# Patient Record
Sex: MALE | Race: WHITE
[De-identification: names, ages, dates, MRNs, and addresses within clinical notes are randomized per-mention and may not be internally consistent; named-entity substitution may affect disease eponyms.]

---

## 2017-07-30 ENCOUNTER — HOSPITAL ENCOUNTER (OUTPATIENT)
Dept: HOSPITAL 76 - EMS | Age: 82
End: 2017-07-30
Attending: SURGERY
Payer: MEDICARE

## 2017-07-30 DIAGNOSIS — Z03.89: Primary | ICD-10-CM
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
## 2017-11-11 ENCOUNTER — HOSPITAL ENCOUNTER (OUTPATIENT)
Dept: HOSPITAL 76 - EMS | Age: 82
Discharge: ANOTHER HOSPITAL | End: 2017-11-11
Attending: SURGERY
Payer: MEDICARE

## 2017-11-11 ENCOUNTER — HOSPITAL ENCOUNTER (OUTPATIENT)
Dept: HOSPITAL 76 - ED | Age: 82
Setting detail: OBSERVATION
LOS: 1 days | Discharge: HOME | End: 2017-11-12
Attending: INTERNAL MEDICINE | Admitting: INTERNAL MEDICINE
Payer: MEDICARE

## 2017-11-11 DIAGNOSIS — R07.9: Primary | ICD-10-CM

## 2017-11-11 DIAGNOSIS — I71.4: ICD-10-CM

## 2017-11-11 DIAGNOSIS — E87.1: ICD-10-CM

## 2017-11-11 DIAGNOSIS — R10.12: ICD-10-CM

## 2017-11-11 DIAGNOSIS — F03.90: ICD-10-CM

## 2017-11-11 DIAGNOSIS — J20.9: ICD-10-CM

## 2017-11-11 DIAGNOSIS — I69.351: ICD-10-CM

## 2017-11-11 DIAGNOSIS — I25.10: ICD-10-CM

## 2017-11-11 DIAGNOSIS — I11.9: ICD-10-CM

## 2017-11-11 DIAGNOSIS — L89.512: ICD-10-CM

## 2017-11-11 DIAGNOSIS — L89.212: ICD-10-CM

## 2017-11-11 DIAGNOSIS — Z66: ICD-10-CM

## 2017-11-11 DIAGNOSIS — E87.5: ICD-10-CM

## 2017-11-11 DIAGNOSIS — I69.322: ICD-10-CM

## 2017-11-11 DIAGNOSIS — I70.0: ICD-10-CM

## 2017-11-11 DIAGNOSIS — Z79.82: ICD-10-CM

## 2017-11-11 DIAGNOSIS — I69.320: ICD-10-CM

## 2017-11-11 DIAGNOSIS — E86.0: ICD-10-CM

## 2017-11-11 DIAGNOSIS — I71.2: ICD-10-CM

## 2017-11-11 LAB
ALBUMIN/GLOB SERPL: 1 {RATIO} (ref 1–2.2)
ANION GAP SERPL CALCULATED.4IONS-SCNC: 11 MMOL/L (ref 6–13)
BASOPHILS NFR BLD AUTO: 0.4 %
BILIRUB BLD-MCNC: 0.5 MG/DL (ref 0.2–1)
BUN SERPL-MCNC: 21 MG/DL (ref 6–20)
CALCIUM UR-MCNC: 9.1 MG/DL (ref 8.5–10.3)
CHLORIDE SERPL-SCNC: 92 MMOL/L (ref 101–111)
CO2 SERPL-SCNC: 23 MMOL/L (ref 21–32)
CREAT SERPLBLD-SCNC: 1.2 MG/DL (ref 0.6–1.2)
CUL URINE ADD CHARGE: (no result)
EOSINOPHIL NFR BLD AUTO: 0.9 %
ERYTHROCYTE [DISTWIDTH] IN BLOOD BY AUTOMATED COUNT: 14.6 % (ref 12–15)
GFRSERPLBLD MDRD-ARVRAT: 57 ML/MIN/{1.73_M2} (ref 89–?)
GLOBULIN SER-MCNC: 3.5 G/DL (ref 2.1–4.2)
GLUCOSE SERPL-MCNC: 141 MG/DL (ref 70–100)
HCT VFR BLD AUTO: 36.4 % (ref 42–52)
HGB UR QL STRIP: 11.9 G/DL (ref 14–18)
LIPASE SERPL-CCNC: 88 U/L (ref 22–51)
LYMPHOBLASTS # BLD: 7 %
LYMPHOCYTES NFR BLD AUTO: 7.8 %
MCH RBC QN AUTO: 30.4 PG (ref 27–31)
MCHC RBC AUTO-ENTMCNC: 32.7 G/DL (ref 32–36)
MCV RBC AUTO: 92.8 FL (ref 80–94)
MONOCYTES NFR BLD AUTO: 4.6 %
NEUTROPHILS # SNV AUTO: 13.5 X10^3/UL (ref 4.8–10.8)
NEUTROPHILS NFR BLD AUTO: 86.3 %
NEUTS BAND NFR BLD MANUAL: 84 %
NEUTS BAND NFR BLD: 0 %
NP AUTO DIFFERENTIAL?: YES
NP MAN DIFFERENTIAL?: NO
PDW BLD AUTO: 7.1 FL (ref 7.4–11.4)
PH UR STRIP.AUTO: 6 PH (ref 5–7.5)
PLATELET BLD QL SMEAR: (no result)
POTASSIUM SERPL-SCNC: 5.3 MMOL/L (ref 3.5–5)
PROT SPEC-MCNC: 7 G/DL (ref 6.7–8.2)
RBC MAR: 3.92 10^6/UL (ref 4.7–6.1)
SODIUM SERPLBLD-SCNC: 126 MMOL/L (ref 135–145)
SP GR UR STRIP.AUTO: 1.01 (ref 1–1.03)
UA CHARGE (STRIP ONLY): YES
UA W/ MICROSCOPIC CHARGE: (no result)
UR CULTURE IF IND: (no result)
UROBILINOGEN UR STRIP.AUTO-MCNC: NEGATIVE MG/DL
WBC # BLD: 13.5 X10^3/UL

## 2017-11-11 PROCEDURE — 71275 CT ANGIOGRAPHY CHEST: CPT

## 2017-11-11 PROCEDURE — 51701 INSERT BLADDER CATHETER: CPT

## 2017-11-11 PROCEDURE — 93005 ELECTROCARDIOGRAM TRACING: CPT

## 2017-11-11 PROCEDURE — 99283 EMERGENCY DEPT VISIT LOW MDM: CPT

## 2017-11-11 PROCEDURE — 99284 EMERGENCY DEPT VISIT MOD MDM: CPT

## 2017-11-11 PROCEDURE — 70450 CT HEAD/BRAIN W/O DYE: CPT

## 2017-11-11 PROCEDURE — 81001 URINALYSIS AUTO W/SCOPE: CPT

## 2017-11-11 PROCEDURE — 96372 THER/PROPH/DIAG INJ SC/IM: CPT

## 2017-11-11 PROCEDURE — 96361 HYDRATE IV INFUSION ADD-ON: CPT

## 2017-11-11 PROCEDURE — 83690 ASSAY OF LIPASE: CPT

## 2017-11-11 PROCEDURE — 94640 AIRWAY INHALATION TREATMENT: CPT

## 2017-11-11 PROCEDURE — 99285 EMERGENCY DEPT VISIT HI MDM: CPT

## 2017-11-11 PROCEDURE — 80048 BASIC METABOLIC PNL TOTAL CA: CPT

## 2017-11-11 PROCEDURE — 84484 ASSAY OF TROPONIN QUANT: CPT

## 2017-11-11 PROCEDURE — 36415 COLL VENOUS BLD VENIPUNCTURE: CPT

## 2017-11-11 PROCEDURE — 80053 COMPREHEN METABOLIC PANEL: CPT

## 2017-11-11 PROCEDURE — 71010: CPT

## 2017-11-11 PROCEDURE — 96374 THER/PROPH/DIAG INJ IV PUSH: CPT

## 2017-11-11 PROCEDURE — 85025 COMPLETE CBC W/AUTO DIFF WBC: CPT

## 2017-11-11 PROCEDURE — 74177 CT ABD & PELVIS W/CONTRAST: CPT

## 2017-11-11 PROCEDURE — 87086 URINE CULTURE/COLONY COUNT: CPT

## 2017-11-11 PROCEDURE — 83880 ASSAY OF NATRIURETIC PEPTIDE: CPT

## 2017-11-11 PROCEDURE — 81003 URINALYSIS AUTO W/O SCOPE: CPT

## 2017-11-11 RX ADMIN — SODIUM CHLORIDE, PRESERVATIVE FREE SCH: 5 INJECTION INTRAVENOUS at 22:02

## 2017-11-11 RX ADMIN — HEPARIN SODIUM SCH UNIT: 5000 INJECTION, SOLUTION INTRAVENOUS; SUBCUTANEOUS at 22:09

## 2017-11-11 NOTE — CT REPORT
EXAM:

CT HEAD

 

EXAM DATE: 11/11/2017 04:47 PM.

 

CLINICAL HISTORY: Headache.

 

COMPARISON: 08/17/2016.

 

TECHNIQUE: Multiaxial CT images were obtained from the foramen magnum to the vertex. Reformats: Coron
al. IV contrast: None.

 

In accordance with CT protocol optimization, one or more of the following dose reduction techniques w
ere utilized for this exam: automated exposure control, adjustment of mA and/or KV based on patient s
ize, or use of iterative reconstructive technique.

 

FINDINGS:

Parenchyma: There is a large area of encephalomalacia in the left cerebral hemisphere consistent with
 an old large MCA territory infarct. The size and appearance of the infarct appears without significa
nt change. Negative for acute hemorrhage. No midline shift. 

 

Extraaxial Spaces:  There is volume loss without abnormal intracranial fluid collections.

 

Ventricles: The ventricles appear symmetric in size and normal in location.

 

Sinuses and Orbits: Imaged paranasal sinuses, orbits, and mastoids show no significant abnormality.

 

Bones: No calvarium fracture.

 

Other: None.

 

IMPRESSION: 

1. 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 

2. No acute hemorrhage, mass effect, or other interval change.

 

[facility]

Referring Provider Line: [phone]

 

SITE ID: [id number]

## 2017-11-11 NOTE — XRAY PRELIMINARY REPORT
Accession: [id number]

Exam: XR CHEST 1 VIEW

 

IMPRESSION: Mild pulmonary edema pattern present, new from 08/15/2016.

 

[facility]

 

SITE ID: [id number]

## 2017-11-11 NOTE — CT REPORT
EXAM:

CT ABDOMEN AND PELVIS

 

EXAM DATE: 11/11/2017 04:46 PM.

 

CLINICAL HISTORY: Left lower quadrant abdominal pain.

 

COMPARISONS: None.

 

TECHNIQUE: Routine helical CT imaging was performed through the abdomen and pelvis. IV contrast: 100M
L OF ISOVUE 300. Enteric contrast: No. Reconstructions: Coronal and sagittal.

 

In accordance with CT protocol optimization, one or more of the following dose reduction techniques w
ere utilized for this exam: automated exposure control, adjustment of mA and/or KV based on patient s
ize, or use of iterative reconstructive technique.

 

FINDINGS: 

Lung Bases: There are nodular opacities seen at the periphery of the lateral right middle lobe in add
ition to the lung bases, right greater than left.

 

Liver: Normal. No masses.

 

Gallbladder/Bile Ducts: Post cholecystectomy changes noted, with mild intrahepatic bile duct dilatati
on.

 

Spleen: Normal.

 

Pancreas: Normal.

 

Adrenal Glands: Normal.

 

Kidneys: Bilateral renal cysts are present. No hydronephrosis seen bilaterally. Nonobstructing stones
 are seen in the left kidney, measuring up to 7 mm in greatest dimension at the midportion.

 

Peritoneal Cavity/Bowel: Normal. No free fluid, free air or adenopathy. No masses or acute inflammato
ry process. Appendix is not conclusively seen.

 

Pelvic Organs: Normal. The bladder and visualized pelvic organs are within normal limits.

 

Vasculature: Diffuse vascular calcifications are seen, with additional soft plaque present. There is 
prominence of the infrarenal aorta, measuring up to 4.5 cm in greatest dimension.

 

Bones: Moderate severe multilevel osteoarthritic changes seen, with compression deformity involving L
1. Posterior spinal hardware is noted at the level of L3-L5. Levoscoliosis is seen. Patient is status
 post pinning of the right hip.

 

Other: None.

 

IMPRESSION: 

1. Nodular opacities at the periphery of the lateral right middle lobe in addition to the lung bases.
 While these may be inflammatory, neoplastic etiology cannot be fully excluded. Clinical correlation 
is recommended. 

2. Postcholecystectomy changes with mild intrahepatic bile duct dilatation. 

3. Bilateral renal cysts are seen, with nonobstructing left renal calculus measuring up to 7 mm. 

4. Diffuse vascular calcifications noted, with soft and hard plaque present. There is prominence of t
he infrarenal aorta measuring up to 4.5 cm in greatest dimension. 

5. Moderate severe multilevel osteoarthritic changes, with compression deformity involving L1, age in
determinate. Posterior spinal hardware present at L3-L5, with closed reduction pinning of right femur
.

 

[facility]

Referring Provider Line: [phone]

 

SITE ID: [id number]

## 2017-11-11 NOTE — CT REPORT
EXAM:

CTA CHEST

 

EXAM DATE: 11/11/2017 05:55 p.m.

 

CLINICAL HISTORY: Chest pain..

 

COMPARISON: 11/11/2017. 08/17/2016.

 

TECHNIQUE: Following the intravenous administration of 75 mL of Isovue-370, multiplanar 3D/MIP recons
truction of the thoracic aorta was performed.

 

In accordance with CT protocol optimization, one or more of the following dose reduction techniques w
ere utilized for this exam: automated exposure control, adjustment of mA and/or KV based on patient s
ize, or use of iterative reconstructive technique.

 

FINDINGS: 

Vascular Structures: No dissection. Extensive calcified and noncalcified plaque is noted largely in t
he aortic arch and descending thoracic aorta, as well as the abdominal aorta. Extensive lobular plaqu
e, noncalcified, extends into the lumen of the aortic arch distally and throughout the descending tho
racic aorta noting that some noncalcified plaque is ulcerative. No penetrating plaque is definitively
 seen. Calcified and noncalcified plaque is seen in the abdominal aorta. No dissection. No periaortic
 hematoma.

 

Maximal size of the ascending aorta at the mid sinuses of Valsalva measures 3.7-3.8 cm. maximum size 
of mid ascending aorta measures 4.6-4.7 cm meters. Aortic arch measures 3.8 cm. Proximal descending t
horacic aorta measures 4 cm. Mid descending thoracic aorta measures 3.7 cm. Distal thoracic descendin
g aorta measures 3.2 cm. Infrarenal abdominal aortic aneurysmal dilatation is seen measuring in AP di
mension 4.3 cm, and in transverse dimension on the coronal images, 4.2 cm. Extensive calcified and no
ncalcified plaque is seen  in the abdominal aorta. The SMA and celiac are patent. There appears steno
sis  caused by calcified plaque at the origin of the celiac and SMA. MARIZOL is patent.

 

Lungs/Pleura: No endobronchial obstruction. There is multifocal centrilobular and nodular airspace op
acities in the right upper lobe, right middle lobe, and right lower lobe as well as a left lower lobe
. There is bronchial dilatation and bronchiectasis multifocally seen bilaterally largely in the right
 lung and left lower lobe. Nodular opacities are seen in the left lower lobe with some cystic change 
noted in the left lower lobe. Basilar parenchymal scarring and pleural thickening. Calcified left low
er lobe granuloma noted.

 

Mediastinum: Heart is enlarged. Coronary artery calcifications. Small hiatal hernia. Calcified medias
tinal and hilar lymph nodes. In the right lobe of the thyroid gland is a 13 mm nodule. Trace pericard
ial effusion. Calcified mediastinal and hilar lymph nodes are seen. Small hiatal hernia.

 

Upper Abdomen: Included portions of the liver, spleen, pancreas, and adrenals are unremarkable. Kidne
ys enhance. There is contrast present within both renal collecting systems. Bilateral renal low-atten
uation lesions are seen, possibly cysts, the largest in the mid left kidney measuring 4.2 cm, and in 
the posterior upper pole of the right kidney measuring 3.1 cm.

 

Other: Stomach is mildly distended. Small bowel is fluid-filled with mild gaseous distention without 
obstruction. Diverticula are seen in portion of the colon, largely distally.  Known for diverticuliti
s.

 

No large retroperitoneal or mesenteric lymph nodes.

 

There is thoracic and lumbar scoliosis. Degenerative changes of the thoracic and lumbar spine. There 
has been a lumbar fusion incompletely included. There has been a lumbar laminectomy. No acute osseous
 abnormalities. Degenerative changes of both shoulders. Healed right proximal humerus fracture.

 

IMPRESSION: 

1. No evidence of aortic dissection or pulmonary embolus. 

2. 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 Mild dilatation of the pro
ximal descending thoracic aorta measuring 4 cm. 

3. Cardiomegaly. Coronary artery atherosclerosis. Extensive calcified and noncalcified atheromatous p
laque involving the distal aortic arch, descending thoracic aorta and abdominal aorta, with extensive
 irregular noncalcified plaque extending into the lumen of the descending thoracic aorta and abdomina
l aorta, some of which is ulcerative particularly within the descending thoracic aorta. 

4. Aneurysmal dilatation of the abdominal aorta.

5. Multifocal multilobar nodular airspace consolidation in the right lung and left lower lobe with mu
ltifocal bronchial dilatation/bronchiectasis with peripheral mucus opacification and bronchial wall t
hickening. 

6. Bilateral renal cysts. 

7. Fluid-filled small bowel and colon and gaseous distention of small bowel and colon without evidenc
e for obstruction.

 

[facility]

Referring Provider Line: [phone]

 

SITE ID: [id number]

## 2017-11-11 NOTE — XRAY REPORT
EXAM: 

CHEST RADIOGRAPHY

 

EXAM DATE: 11/11/2017 04:44 PM.

 

CLINICAL HISTORY: Left-sided chest pain.

 

COMPARISON: 08/15/2016.

 

TECHNIQUE: 1 view.

 

FINDINGS:

Lungs/Pleura: Mild pulmonary edema pattern present. No gross consolidation seen.

 

Mediastinum: Within exam limitations, the cardiomediastinal contour is normal.

 

Other: None.

 

IMPRESSION: Mild pulmonary edema pattern present, new from 08/15/2016.

 

[facility]

Referring Provider Line: [phone]

 

SITE ID: [id number]

## 2017-11-11 NOTE — ED PHYSICIAN DOCUMENTATION
PD HPI ABD PAIN





- Stated complaint


Stated Complaint: ABD PX





- Chief complaint


Chief Complaint: Abd Pain





- History obtained from


History obtained from: Patient, Family, EMS





- History of Present Illness


Timing - onset: Other (2)


Timing - duration: Hours (2)


Timing - details: Abrupt onset


Quality: Pain


Location: LUQ, Other (L chest)


Radiation: Other (non-radiating)


Improved by: Other (nothing)


Worsened by: Other (nothing)


Associated symptoms: Chest pain (L chest pain).  No: Fever, Nausea, Vomiting, 

Hematemesis, Diarrhea, Constipation, Melena, Hematochezia, Dysuria, Hematuria


Recently seen: Not recently seen





- Additional information


Additional information: 





EMS states they were told the patient had L chest/LUQ abdominal pain starting 

today at 2pm. No further history available. Patient states he has no pain. 

Patient is a poor historian. No information other than a med list sent from 

[facility], will try to call the nurse for further history. 





[facility] states the he complained of a left sided headache and dizziness. They 

took him to lunch, where before eating lunch he developed L sided chest and abd 

pain. Patient points to L chest when asked where is pain is. 





Review of Systems


Unable to obtain: Other (poor historian.)


Ten Systems: 10 systems reviewed and negative


Constitutional: denies: Fever


Ears: denies: Ear pain


Nose: denies: Rhinorrhea / runny nose, Congestion


Throat: denies: Sore throat


Cardiac: denies: Palpitations


Respiratory: denies: Dyspnea, Cough, Hemoptysis, Wheezing


GI: denies: Nausea, Vomiting, Diarrhea


: denies: Dysuria


Skin: denies: Rash


Musculoskeletal: denies: Neck pain, Back pain


Neurologic: reports: Focal weakness (R sided, baseline for patient since his 

stroke.).  denies: LOC





PD PAST MEDICAL HISTORY





- Past Medical History


Past Medical History: Yes


Neuro: CVA


: Incontinence





- Past Surgical History


Past Surgical History: Yes


General: Cholecystectomy


HEENT: Tonsil/Adenoidectomy





- Present Medications


Home Medications: 


 Ambulatory Orders











 Medication  Instructions  Recorded  Confirmed


 


Aspirin [Adult Low Dose Aspirin EC] 81 mg PO DAILY 08/14/16 11/11/17


 


Atorvastatin Calcium [Lipitor] 40 mg PO QPM 08/14/16 11/11/17


 


Lisinopril [Zestril] 20 mg PO DAILY 08/14/16 11/11/17


 


HYDROcod/ACETAM 5/325 [Norco 5/325] 1 tab PO Q4HR PRN #30 tablet 08/21/16 11/11/ 17


 


Acetaminophen 500 mg PO Q4HR PRN 11/01/17 11/11/17


 


Multivitamin [Multiple Vitamins] 1 tab PO DAILY 11/01/17 11/11/17


 


Polyethylene Glycol 3350 [Miralax] 17 gm PO DAILY 11/01/17 11/11/17


 


QUEtiapine [SEROquel] 25 mg PO BID 11/01/17 11/11/17


 


Diclofen Sod/Kinesiology Tape 1 pad TOP DAILY 11/11/17 11/11/17





[Diclo Gel 1%-Xrylix Sheet Kit]   


 


Nystatin 1 applic TOP BID 11/11/17 11/11/17














- Allergies


Allergies/Adverse Reactions: 


 Allergies











Allergy/AdvReac Type Severity Reaction Status Date / Time


 


No Known Drug Allergies Allergy   Verified 08/11/16 18:57














- Social History


Does the pt smoke?: No


Smoking Status: Never smoker


Does the pt drink ETOH?: No


Does the pt have substance abuse?: No





- Immunizations


Immunizations are current?: Yes





PD ED PE NORMAL





- Vitals


Vital signs reviewed: Yes





- General


General: Alert and oriented X 3, No acute distress





- HEENT


HEENT: Moist mucous membranes





- Neck


Neck: Supple, no meningeal sign





- Cardiac


Cardiac: RRR





- Respiratory


Respiratory: No respiratory distress, Clear bilaterally





- Abdomen


Abdomen: Soft, Non tender, Non distended





- Back


Back: No spinal TTP





- Derm


Derm: Warm and dry, No rash





- Neuro


Neuro: Alert and oriented X 3





- Psych


Psych: Normal affect





Results





- Vitals


Vitals: 


 Vital Signs - 24 hr











  11/11/17 11/11/17 11/11/17





  14:56 16:46 18:27


 


Temperature 35.3 C L  36.5 C


 


Heart Rate 64 72 76


 


Respiratory 16 16 16





Rate   


 


Blood Pressure 109/56 L 130/59 L 118/53 L


 


O2 Saturation 97 98 96














  11/11/17





  19:42


 


Temperature 


 


Heart Rate 80


 


Respiratory 18





Rate 


 


Blood Pressure 125/60


 


O2 Saturation 100








 Oxygen











O2 Source                      Room air

















- EKG (time done)


  ** 1543


Rate: Rate (enter#) (68)


Rhythm: NSR


Axis: Normal


Intervals: Other (short PA)


QRS: Normal


Ischemia: Normal ST segments


Computer interpretation: Agree with computer





- Labs


Labs: 


 Laboratory Tests











  11/11/17 11/11/17 11/11/17





  15:36 15:36 15:36


 


WBC  13.5 H  


 


RBC  3.92 L  


 


Hgb  11.9 L  


 


Hct  36.4 L  


 


MCV  92.8  


 


MCH  30.4  


 


MCHC  32.7  


 


RDW  14.6  


 


Plt Count  342  


 


MPV  7.1 L  


 


Neut #  Not Reportable  


 


Lymph #  Not Reportable  


 


Mono #  Not Reportable  


 


Eos #  Not Reportable  


 


Baso #  Not Reportable  


 


Absolute Nucleated RBC  Not Reportable  


 


Band Neuts % (Manual)  0  


 


Reactive Lymphs % (Man)  4  


 


Metamyelocytes %  1 H  


 


Nucleated RBC %  Not Reportable  


 


Neutrophils # (Manual)  11.3 H  


 


Lymphocytes # (Manual)  1.5  


 


Monocytes # (Manual)  0.5  


 


Platelet Estimate  NORMAL (130-450,000)  


 


RBC Morph Micro Appear  NORMAL APPEARANCE  


 


Sodium   126 L 


 


Potassium   5.3 H 


 


Chloride   92 L 


 


Carbon Dioxide   23 


 


Anion Gap   11.0 


 


BUN   21 H 


 


Creatinine   1.2 


 


Estimated GFR (MDRD)   57 L 


 


Glucose   141 H 


 


Calcium   9.1 


 


Total Bilirubin   0.5 


 


AST   34 


 


ALT   36 


 


Alkaline Phosphatase   132 H 


 


Troponin I    < 0.04


 


B-Natriuretic Peptide   


 


Total Protein   7.0 


 


Albumin   3.5 


 


Globulin   3.5 


 


Albumin/Globulin Ratio   1.0 


 


Lipase   88 H 


 


Urine Color   


 


Urine Clarity   


 


Urine pH   


 


Ur Specific Gravity   


 


Urine Protein   


 


Urine Glucose (UA)   


 


Urine Ketones   


 


Urine Occult Blood   


 


Urine Nitrite   


 


Urine Bilirubin   


 


Urine Urobilinogen   


 


Ur Leukocyte Esterase   


 


Ur Microscopic Review   


 


Urine Culture Comments   














  11/11/17 11/11/17





  15:36 17:20


 


WBC  


 


RBC  


 


Hgb  


 


Hct  


 


MCV  


 


MCH  


 


MCHC  


 


RDW  


 


Plt Count  


 


MPV  


 


Neut #  


 


Lymph #  


 


Mono #  


 


Eos #  


 


Baso #  


 


Absolute Nucleated RBC  


 


Band Neuts % (Manual)  


 


Reactive Lymphs % (Man)  


 


Metamyelocytes %  


 


Nucleated RBC %  


 


Neutrophils # (Manual)  


 


Lymphocytes # (Manual)  


 


Monocytes # (Manual)  


 


Platelet Estimate  


 


RBC Morph Micro Appear  


 


Sodium  


 


Potassium  


 


Chloride  


 


Carbon Dioxide  


 


Anion Gap  


 


BUN  


 


Creatinine  


 


Estimated GFR (MDRD)  


 


Glucose  


 


Calcium  


 


Total Bilirubin  


 


AST  


 


ALT  


 


Alkaline Phosphatase  


 


Troponin I  


 


B-Natriuretic Peptide  17 


 


Total Protein  


 


Albumin  


 


Globulin  


 


Albumin/Globulin Ratio  


 


Lipase  


 


Urine Color   YELLOW


 


Urine Clarity   CLEAR


 


Urine pH   6.0


 


Ur Specific Gravity   1.015


 


Urine Protein   NEGATIVE


 


Urine Glucose (UA)   NEGATIVE


 


Urine Ketones   NEGATIVE


 


Urine Occult Blood   TRACE-INTA


 


Urine Nitrite   NEGATIVE


 


Urine Bilirubin   NEGATIVE


 


Urine Urobilinogen   0.2 (NORMAL)


 


Ur Leukocyte Esterase   NEGATIVE


 


Ur Microscopic Review   NOT INDICATED


 


Urine Culture Comments   NOT INDICATED














- Rads (name of study)


  ** head CT


Radiology: Prelim report reviewed, EMP read contemporaneously, See rad report (

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  No 

acute hemorrhage, mass effect, or other interval change. )





  ** CT chest 


Radiology: Prelim report reviewed, EMP read contemporaneously, See rad report (

No evidence of aortic dissection or pulmonary embolus. 2. Aneurysmal dilatation 

of the mid ascending aorta, measuring 4.6-4.7 cm. Mild dilatation of the 

proximal descending thoracic aorta measuring 4 cm. 3. Cardiomegaly. Coronary 

artery atherosclerosis. Extensive calcified and noncalcified atheromatous 

plaque involving the distal aortic arch, descending thoracic aorta and 

abdominal aorta, with extensive irregular noncalcified plaque extending into 

the lumen of the descending thoracic aorta and abdominal aorta, some of which 

is ulcerative particularly within the descending thoracic aorta. 4. Aneurysmal 

dilatation of the abdominal aorta. 5. Multifocal multilobar nodular airspace 

consolidation in the right lung and left lower lobe with multifocal bronchial 

dilatation/bronchiectasis with peripheral mucus opacification and bronchial 

wall thickening. 6. Bilateral renal cysts. 7. Fluid-filled small bowel and 

colon and gaseous distention of small bowel and colon without evidence for 

obstruction. )





  ** CT abd/pelvis


Radiology: Prelim report reviewed, EMP read contemporaneously, See rad report (

Nodular opacities at the periphery of the lateral right middle lobe in addition 

to the lung bases. While these may be inflammatory, neoplastic etiology cannot 

be fully excluded. Clinical correlation is recommended. 2. Postcholecystectomy 

changes with mild intrahepatic bile duct dilatation. 3. Bilateral renal cysts 

are seen, with nonobstructing left renal calculus measuring up to 7 mm. 4. 

Diffuse vascular calcifications noted, with soft and hard plaque present. There 

is prominence of the infrarenal aorta measuring up to 4.5 cm in greatest 

dimension. 5. Moderate severe multilevel osteoarthritic changes, with 

compression deformity involving L1, age indeterminate. Posterior spinal 

hardware present at L3-L5, with closed reduction pinning of right femur. )





  ** cxr


Radiology: Prelim report reviewed, EMP read contemporaneously, See rad report (

Mild pulmonary edema pattern present, new from 08/15/2016. )





PD MEDICAL DECISION MAKING





- ED course


Complexity details: reviewed old records, reviewed results, re-evaluated patient

, considered differential, d/w patient, d/w family, d/w consultant


ED course: 





Patient is an 86-year-old male who presents to the emergency department with 

multiple complaints, but the main complaint seems to be left-sided chest pain.  

This resolved in the emergency department.  No acute findings on EKG.  Initial 

troponin is negative.  Head CT is negative.  CT of the chest, abdomen and 

pelvis refused diffuse atherosclerosis without any acute findings.  Patient is 

DNR with limited interventions.  We will place the patient in observation for 

rule out MI.  Discussed the case with the hospitalist, Dr. Brewer who accepts. 





This document was made in part using voice recognition software. While efforts 

are made to proofread this document, sound alike and grammatical errors may 

occur.





Departure





- Departure


Disposition: ED Place in Observation


Clinical Impression: 


 Hyponatremia, Hyperkalemia





Chest pain


Qualifiers:


 Chest pain type: unspecified Qualified Code(s): R07.9 - Chest pain, unspecified





Condition: Stable


Discharge Date/Time: 11/11/17 21:05

## 2017-11-12 ENCOUNTER — HOSPITAL ENCOUNTER (OUTPATIENT)
Dept: HOSPITAL 76 - EMS | Age: 82
Discharge: HOME | End: 2017-11-12
Attending: SURGERY
Payer: MEDICARE

## 2017-11-12 VITALS — DIASTOLIC BLOOD PRESSURE: 69 MMHG | SYSTOLIC BLOOD PRESSURE: 75 MMHG

## 2017-11-12 DIAGNOSIS — Z74.01: Primary | ICD-10-CM

## 2017-11-12 DIAGNOSIS — I20.9: ICD-10-CM

## 2017-11-12 LAB
ANION GAP SERPL CALCULATED.4IONS-SCNC: 9 MMOL/L (ref 6–13)
BASOPHILS NFR BLD AUTO: 0.7 %
BUN SERPL-MCNC: 15 MG/DL (ref 6–20)
CALCIUM UR-MCNC: 8.7 MG/DL (ref 8.5–10.3)
CHLORIDE SERPL-SCNC: 98 MMOL/L (ref 101–111)
CO2 SERPL-SCNC: 22 MMOL/L (ref 21–32)
CREAT SERPLBLD-SCNC: 0.8 MG/DL (ref 0.6–1.2)
EOSINOPHIL NFR BLD AUTO: 3.1 %
EOSINOPHIL NFR BLD MANUAL: 1 %
ERYTHROCYTE [DISTWIDTH] IN BLOOD BY AUTOMATED COUNT: 14.6 % (ref 12–15)
GFRSERPLBLD MDRD-ARVRAT: 92 ML/MIN/{1.73_M2} (ref 89–?)
GLUCOSE SERPL-MCNC: 87 MG/DL (ref 70–100)
HCT VFR BLD AUTO: 34.1 % (ref 42–52)
HGB UR QL STRIP: 11.2 G/DL (ref 14–18)
LYMPHOBLASTS # BLD: 27 %
LYMPHOCYTES NFR BLD AUTO: 18.1 %
MCH RBC QN AUTO: 30.4 PG (ref 27–31)
MCHC RBC AUTO-ENTMCNC: 32.9 G/DL (ref 32–36)
MCV RBC AUTO: 92.4 FL (ref 80–94)
MONOCYTES NFR BLD AUTO: 6.1 %
NEUTROPHILS # SNV AUTO: 12.9 X10^3/UL (ref 4.8–10.8)
NEUTROPHILS NFR BLD AUTO: 72 %
NEUTS BAND NFR BLD MANUAL: 66 %
NEUTS BAND NFR BLD: 2 %
NP AUTO DIFFERENTIAL?: YES
NP MAN DIFFERENTIAL?: NO
PDW BLD AUTO: 7.4 FL (ref 7.4–11.4)
PLATELET BLD QL SMEAR: (no result)
POTASSIUM SERPL-SCNC: 4.8 MMOL/L (ref 3.5–5)
RBC MAR: 3.69 10^6/UL (ref 4.7–6.1)
SODIUM SERPLBLD-SCNC: 129 MMOL/L (ref 135–145)
TOTAL CELLS COUNTED BLD: 100
WBC # BLD: 12.9 X10^3/UL

## 2017-11-12 RX ADMIN — Medication SCH MG: at 05:19

## 2017-11-12 RX ADMIN — IPRATROPIUM BROMIDE AND ALBUTEROL SULFATE SCH ML: 2.5; .5 SOLUTION RESPIRATORY (INHALATION) at 07:20

## 2017-11-12 RX ADMIN — HEPARIN SODIUM SCH UNIT: 5000 INJECTION, SOLUTION INTRAVENOUS; SUBCUTANEOUS at 09:00

## 2017-11-12 RX ADMIN — Medication SCH MG: at 08:57

## 2017-11-12 RX ADMIN — AMOXICILLIN AND CLAVULANATE POTASSIUM SCH TAB: 875; 125 TABLET, FILM COATED ORAL at 08:56

## 2017-11-12 RX ADMIN — SODIUM CHLORIDE, PRESERVATIVE FREE SCH ML: 5 INJECTION INTRAVENOUS at 05:19

## 2017-11-12 RX ADMIN — AMOXICILLIN AND CLAVULANATE POTASSIUM SCH TAB: 875; 125 TABLET, FILM COATED ORAL at 05:19

## 2017-11-12 RX ADMIN — IPRATROPIUM BROMIDE AND ALBUTEROL SULFATE SCH: 2.5; .5 SOLUTION RESPIRATORY (INHALATION) at 05:00

## 2017-11-12 NOTE — DISCHARGE PLAN
Discharge Plan


Disposition: 01 Home, Self Care


Condition: Stable


Prescriptions: 


Amox/Clav 875/125 [Augmentin 875/125] 1 tab PO BID #20 tablet


Nitroglycerin 0.2 mg/Hr Patch [Nitro-Dur] 1 patch TOP DAILY #30 patch


Diet: Cardiac


Activity Restrictions: Wt Bearing as Tolerated


Shower Restrictions: Yes


Driving Restrictions: Yes (No driving)


Assistance Devices: Wheelchair


Additional Instructions or Follow Up instructions: 


Start new prescription for chest pain: topical Nitro Patch daily


Start new prescription for urinary tract infection: a course of antibiotics has 

been prescribed, take all tablets till done


Resume all other medications as before this hospitalization


No Smoking: If you smoke, Please STOP!  Call [phone] for help.

## 2017-11-12 NOTE — HISTORY & PHYSICAL EXAMINATION
DATE OF ADMISSION: 11/11/2017

 

CHIEF COMPLAINT: The patient is a poor historian, multiple complaints, chief 
complaint is difficult to identify perhaps chest pain or abdominal pain.

 

SOURCE OF HISTORY: The history was obtained from ER report and reviewing 
medical records. The patient has dementia plus aphasia secondary to 
cerebrovascular accident and at baseline he would be a limited historian. At 
the time I examined him there were no additional problems. He received 
sedatives in the ER and was not able to interact.

 

HISTORY OF PRESENT ILLNESS: The patient is an 86-year-old white male with past 
medical history of left MCA territory infarct with residual deficit of dense 
right-sided hemiplegia with aphasia as well. The stroke happened in 1991 and 
since then the patient has limited mobility and declining function. He resides 
at a dementia/memory care facility at [facility] on [facility]. Most recently patient'
s medical history had been complicated by decubitus ulcers, he has decubiti on 
the right side involving his hip and lower extremity down to his ankle. In 
particular, he had a palliative care evaluation done by Jessica DEL RIO on 11/
01/2017. I reviewed the very informative note and from that it seems that the 
patient receives wound care. His CODE STATUS was determined to be DO NOT 
RESUSCITATE, but he still receives medical therapies per limited interventions.

 

On the evening of 11/11/2017, the patient was brought to [facility] ER from the 
[facility] care facility; nurses reporting a somewhat acute onset of abdominal pain 
around 2 p.m. The pain lasted for about an hour. When the patient arrived to 
the ER, he pointed to his chest and it seemed that rather than abdominal pain 
he really had chest discomfort. No further history could be obtained. Therefore
, the ER physician ordered a workup which included CT angiography of the chest, 
abdomen and pelvis. In addition, given history of stroke, CT of the head was 
done as well. The CT chest showed numerous abnormalities, which included 
multiple chronic abnormalities, mostly aortic dilatation on multiple sites and 
coronary arteriosclerosis with cardiomegaly. In addition, multifocal multilobar 
nodular airspace consolidation was described in the right lung and to some 
extent in the left lower lobe as well. It was described as bronchial wall 
dilatation, bronchiectasis and mucus opacification and bronchial wall 
thickening. In addition, fluid filled small bowel and colon were also seen with 
distention but no evidence for obstruction. In particular, there was no 
evidence of aortic dissection or pulmonary embolus. On the CT scan of the brain
, there was no acute abnormality. Additional ER workup included EKG, which did 
not show acute ischemia and troponin which was negative. Laboratories showed 
some abnormalities, which included hyponatremia with sodium of 126 and 
hyperkalemia with potassium of 5.3 and white blood cell count was 13.5, 
hemoglobin was 11.9. BUN was 21, creatinine 1.2. Urinalysis was negative.

 

Notably for the patient to undergo CT scans he needed to be sedated. Therefore, 
he was given Ativan. In addition, he was also given Sucralfate, Mylanta, 
aspirin and belladonna/phenobarbital in the ER.

 

At the time I examined the patient, he was somnolent. He appeared comfortable, 
had stable vital signs and could not meaningfully interact or offer any history

 

PAST MEDICAL HISTORY:

1. Left middle cerebral ischemic stroke in 1991 resulting in right hemiplegia 
and aphasia.

2. Extensive decubitus mostly stage IV, involving the right lower extremity 
between the hip and ankle.

3. Dementia.

4. Hypertension.

5. History of humerus fracture.

 

OUTPATIENT MEDICATIONS:

1. Lisinopril.

2. Multivitamin.

3. Lipitor.

4. Aspirin.

5. Tylenol.

6. MiraLax.

8. Seroquel.

9. Norco.

 

ALLERGIES: NO KNOWN DRUG ALLERGIES.

 

FAMILY HISTORY: The patient could not provide.

 

REVIEW OF SYSTEMS

I attempted review, the patient could not provide.

 

SOCIAL HISTORY: The patient is an extended care facility resident, he resides 
at [facility] in [facility].

 

PRIMARY CARE PHYSICIAN: Armond Kramer.



 

CODE STATUS: DO NOT RESUSCITATE WITH LIMITED ADDITIONAL INTERVENTION LISTED IN 
FACILITY RECORDS.

 

PHYSICAL EXAMINATION:

VITAL SIGNS: Blood pressure around 100/50, heart rate in the 70s. Respiratory 
rate between 14 and 18. Oxygen saturation 100% on room air, temperature 96.5 
Celsius.

GENERAL: The patient is well-developed, chronically ill-appearing, elderly male
, thin and frail.

MUSCULOSKELETAL: Decreased muscle mass and muscle tone with decubiti involving 
the right lower extremity, mostly stage 2.

SKIN: With pallor. No jaundice. Decubitus as mentioned above.

HEART: S1, S2.

RESPIRATORY: Lungs clear to auscultation bilaterally without wheezes, or 
crackles.

ABDOMEN: Benign. Bowel tones present. Abdomen is slightly distended. No guarding
, no rebound.

LYMPH: No lymphedema.

NEUROLOGIC: With global encephalopathy, sedated and in addition appeared with 
right hemiplegia. I did not notice new focal deficit.  The patient was 
somnolent and sedated, could not cooperate with exam. PSYCH: Sedated.

 

ASSESSMENT AND PLAN/ACTIVE ISSUES AND DIAGNOSES:

1. Initial chief complaint was perhaps chest pain, ER requested rapid cardiac 
rule out which is getting done. The first troponin was negative. EKG was 
unremarkable. We will check a second troponin in the morning. I am not sure 
that is much utility and no further evaluation would make sense in this patient 
who has LIMITED CODE STATUS and the family reported in the ER that they would 
not want any aggressive measure or invasive evaluation. ER physician mentioned 
to me that even if this patient would have a heart attack the family would not 
consider any further intervention; however, "they would like to know."

2. Abnormal electrolytes including hyponatremia and hyperkalemia. This is on 
lisinopril in the setting of elevated BUN and blood pressure on the lower side. 
The patient appearing dehydrated. This is most likely secondary to diuresis and 
low oral intake. We will hold lisinopril, provide IV hydration and repeat 
electrolytes in the morning. If potassium is still elevated, then a small dose 
of Kayexalate could be considered. Notably, when I saw the patient, he was not 
able to take oral intake being somnolent.

3. Abnormal imaging.  There are multiple chronic abnormalities, which include 
extensive dilatation of the aorta at multiple sites and extensive coronary 
calcifications and atheromatous changes. These do not have any clinical 
significance as far as no intervention would be indicated. There is nodular 
airspace consolidation, bronchiectasis and bronchial wall thickening in the 
chest and that might indicate acute bronchitis on the top of chronic bronchitis
, bronchiectasis. Notably, this patient is debilitated and he would have 
aspiration risk. Considering this finding I started Augmentin, added Florastor 
for bowel prophylaxis and ordered swallow evaluation. The patient might need 
modified diet. Will also use bronchodilators. Undergoing treatment with IV 
antibiotics I do not think that would be much benefit and risk from 
complications would probably outweigh the benefit. Oral Augmentin should work 
just as well.

4. Right lower extremity decubitus ulcers, we will request wound care. 

 

PLAN AND ORDERS: The patient is getting admitted under observation status with 
the active issues I listed above. I expect short hospital stay. If electrolytes 
improve/get corrected, then the patient can be discharged back to the [facility] 
care facility. In addition, while hospitalized, he will receive DVT prophylaxis
, high protein diet, aspiration precautions, assistance with feeding. Request 
social work for discharge planning. Regarding discharge medications I would 
probably not continue lisinopril as this patient has limited function, he 
likely has limited oral intake and he would be prone to dehydration. 



Time spent in the care of this patient was 60 minutes.

 

 

 

DD:11/12/2017 04:47:00  DT: 11/12/2017 05:35  JOB #: [id number]  EXT JOB #:[id number]

DIANNE

## 2017-11-12 NOTE — DISCHARGE SUMMARY
DATE OF ADMISSION: 11/11/2017

 

DATE OF DISCHARGE: 11/12/2017

 

 

 

ADDENDUM:  Discharge diagnosis #3 initially said, but should not be, urinary 
tract infection. It is Acute bronchitis: On chest imaging workup, the patient 
was found to have "nodular airspace consolidation, bronchiectasis, and 
bronchial wall thickening in the chest and that might indicate acute bronchitis 
on top of chronic bronchitis/bronchiectasis." At the time of discharge, he was 
placed on a course of Augmentin with no refills.

 

***** CORRECTED PER ADDENDUM 11/24/17 JL ****** 

 

 

 

 

 

CHIEF COMPLAINT: Abdominal pain versus chest pain.

 

HOSPITAL COURSE: This is an 86-year-old white male with a history of stroke, 
leaving him with right-sided hemiplegia and aphasia, history of dementia, 
history of decubitus ulcers, and also on the right hip and ankle. He resides in 
a dementia assisted care facility. He was brought to the emergency room because 
the staff noticed that he was complaining of abdominal pain. In the emergency 
room, he was pointing to his chest. He was placed in observation for management 
of chest versus abdominal pain.

 

PAST MEDICAL HISTORY: CVA. Hemiplegia and dysarthria. History of decubitus 
ulcers from immobility.

 

DISCHARGE DIAGNOSES AND HOSPITAL COURSE

1. Chest pain. The admitting nocturnist only got the impression that the 
patient had chest pain in speaking to him and examining him. The patient was 
placed in a rule out myocardial infarction protocol on telemetry. He had normal 
troponins. He was started on nitro patch 0.2 mg topically for treatment of 
probable angina. His evaluation showed extensive atherosclerosis in the 
coronary arteries, thoracic aorta, and abdominal aorta, which was seen on CT 
angio. The results also showed evidence of thoracic aortic aneurysm and 
abdominal aortic aneurysm. There were no further complaints of chest pain after 
being placed in observation.

2. Abdominal pain. The patient also had imaging studies done to evaluate the 
abdomen which was consistent with the abdominal aortic aneurysm and 
atherosclerosis described above. This patient is not a candidate for 
revascularization given his advanced age and advanced impairment from his 
stroke. There were no further complaints of abdominal pain during this 
admission.

3. Acute bronchitis. On chest imaging workup, the patient was found to have 
"nodular airspace consolidation, bronchiectasis, and bronchial wall thickening 
in the chest and that might indicate acute bronchitis on top of chronic 
bronchitis/bronchiectasis." At the time of discharge, he was placed on a course 
of Augmentin with no refills.

4. Hyponatremia. The patient received saline hydration and this improved his 
sodium level from 126 to 129 at time of discharge.

5. Hyperkalemia. With management here, his potassium improved from 5.3 to 4.8 
at the time of discharge.

6. History of stroke with significant impairment. All of his prehospital 
medications were continued including aspirin, cholesterol medication, ACE 
inhibitor. There was no decline in function with this overnight stay here and 
he was discharged in the same impaired condition.

 

CONDITION AT DISCHARGE: Stable. 

 

PHYSICAL EXAMINATION

VITAL SIGNS: Blood pressure 100/60, heart rate in the 60s and 70s in sinus 
rhythm. Afebrile.

HEENT: Slurred speech and dry mucosa.

NECK: No JVD, lethargic rapid angle.

CHEST: Clear.

CARDIOVASCULAR: Heart sounds without murmur.

ABDOMEN: Soft.

EXTREMITIES: Marked weakness on the right arm and slight weakness of the right 
leg and 1+ edema of both lower extremities.

NEUROLOGIC: As above.

 

LABORATORY AND IMAGING: Reviewed and as above.

 

CODE STATUS: During this stay, the patient's CODE STATUS was DO NOT RESUSCITATE.

 

FOLLOWUP APPOINTMENTS: PCP followup in 1-2 weeks.

 

TIME REQUIRED FOR COMPLETION OF DISCHARGE: Thirty minutes.

 

 

 

DD:11/12/2017 17:21:00  DT: 11/12/2017 18:45  JOB #: [id number]  EXT JOB #:[id number]

[email or web address]

## 2017-12-07 ENCOUNTER — HOSPITAL ENCOUNTER (OUTPATIENT)
Dept: HOSPITAL 76 - PC | Age: 82
Discharge: HOME | End: 2017-12-07
Attending: NURSE PRACTITIONER
Payer: MEDICARE

## 2017-12-07 DIAGNOSIS — Z74.01: ICD-10-CM

## 2017-12-07 DIAGNOSIS — F41.9: ICD-10-CM

## 2017-12-07 DIAGNOSIS — L89.529: ICD-10-CM

## 2017-12-07 DIAGNOSIS — R06.2: ICD-10-CM

## 2017-12-07 DIAGNOSIS — Z79.82: ICD-10-CM

## 2017-12-07 DIAGNOSIS — Z79.891: ICD-10-CM

## 2017-12-07 DIAGNOSIS — L89.519: ICD-10-CM

## 2017-12-07 DIAGNOSIS — L89.219: ICD-10-CM

## 2017-12-07 DIAGNOSIS — F01.51: ICD-10-CM

## 2017-12-07 DIAGNOSIS — I69.351: ICD-10-CM

## 2017-12-07 DIAGNOSIS — Z66: ICD-10-CM

## 2017-12-07 DIAGNOSIS — Z51.5: Primary | ICD-10-CM

## 2017-12-07 DIAGNOSIS — I69.320: ICD-10-CM

## 2017-12-07 DIAGNOSIS — L89.619: ICD-10-CM

## 2017-12-07 NOTE — CONSULTATION NOTE
Palliative Care Follow Up





- Referral


Referring Provider: Dr. Breezy Kramer


Time of Visit: 11:30


Referral setting: Assisted living (Seen in home setting, which is [facility] 

dementia unit, due to taxing and considerable effort required to leave the home 

due to significant immobility secondary to R side hemiparesis and dementia with 

behavior disturbance.)





- Information Sources


Records reviewed: RN notes reviewed, Previous records reviewed


History/Review of Systems obtained from: Patient, Caregiver, Nursing


Exam limitations: Clinical condition (Aphasia as late affect of CVA)





- History of Present Illness Update


Brief HPI Update: 





This is an 86 year old gentleman with a right sided hemiparesis due to a CVA 

from 1991. He fractured his R humerus in August 2016. He was hospitalized 

overnight last month (Nov 2017) for chest pain vs abdominal pain.  Tests were 

run; he was treated for UTI, treated with Augmentin. A nitro patch was 

initiated for the chest pain.





Nursing reports increase cough and wet lung sounds, chest xray A/P and lateral 

is ordered to rule out pneumonia.





His R hip wound is not healing due to the patient persistently lying on his R 

side. Other wounds from this behavior are R lateral heel, R lateral malleolus, 

and L medial malleolus.





His hospital bed and A/P mattress was ordered at the beginning of November but 

not yet delivered due to a bureaucratic delay.  Palliative care team is 

following up on it.





The facility is planning to rearrange his room furniture and the TV so that he 

does not need to lie on his R side to watch TV.





During this assessment he displays behaviors such as verbal belligerence and 

aggressiveness, shouting several times "bullshit." Nursing reports this is 

fairly normal. They also say his behaviors have improved. He has significant 

aphasia and his inability to communicate is highly frustrating to him. He did 

eventually cooperate with my assessment. He indicates that he has pain all 

along his right side. He currently is on one dose of Norco daily, I will 

increase it to three. He has not been using the PRN Norco so far this month. He 

also repeatedly gestured to his dentures and removed them. I was unable to 

comprehend what he was trying to say. He does report sleeping well. 





I spoke with his son prior to the visit; he was concerned about the hospital 

bed not arriving yet.








Social History





- Living Situation


Living arrangement: Assisted living ([facility])


Living Situation: With caregiver(s)


Support System: 





His wife lives across the street at [facility]. His son lives in [place].








Medications/Allergies





- Medications


Home Medications: 


 Ambulatory Orders











 Medication  Instructions  Recorded  Confirmed


 


Aspirin [Adult Low Dose Aspirin EC] 81 mg PO DAILY 08/14/16 12/07/17


 


Atorvastatin Calcium [Lipitor] 40 mg PO QPM 08/14/16 12/07/17


 


Lisinopril [Zestril] 20 mg PO DAILY 08/14/16 12/07/17


 


Acetaminophen 500 mg PO Q4HR PRN 11/01/17 12/07/17


 


Multivitamin [Multiple Vitamins] 1 tab PO DAILY 11/01/17 12/07/17


 


Polyethylene Glycol 3350 [Miralax] 17 gm PO DAILY 11/01/17 12/07/17


 


QUEtiapine [SEROquel] 25 mg PO BID 11/01/17 12/07/17


 


Diclofenac Sodium [Voltaren] 2 gm TP TID 11/12/17 12/07/17


 


Hydrocodone/Acetaminophen 1 tab PO Q4H PRN MDD 3000mg 11/12/17 12/07/17





[Hydrocodone-Acetamin 5-325 mg]   


 


Nitroglycerin 0.2 mg/Hr Patch 1 patch TOP DAILY #30 patch 11/12/17 12/07/17





[Nitro-Dur]   


 


Hydrocodone/Acetaminophen 1 tab PO TID MDD 3000 mg APAP daily 12/07/17 12/07/17





[Hydrocodone-Acetamin 5-325 mg]   


 


Senna [Senokot] 8.6 mg PO DAILY 12/07/17 12/07/17














- Allergies


Allergies/Adverse Reactions: 


 Allergies











Allergy/AdvReac Type Severity Reaction Status Date / Time


 


No Known Drug Allergies Allergy   Verified 08/11/16 18:57














Review of Systems





- Constitutional


Constitutional: reports: Weight loss (Nov 2017   217.8 lbs.   Dec 2017   211.6 

lbs.)





- Ears, Nose & Throat


Ears, Nose & Throat: reports: Hearing loss, Dentures (full upper and full lower)





- Cardiovascular


Cardiovascular: denies: Chest pain





- Respiratory


Respiratory: reports: Cough, Wheezing





- Gastrointestinal


Gastrointestinal: reports: Constipation





- Musculoskeletal


Musculoskeletal: reports: Muscle aches, Limited range of motion (Right side 

hemiparesis of many years), Muscle weakness





- Neurological


Neurological: reports: Memory problems, Slurred speech (aphasia, unintelligible 

speech)





- Psychiatric


Psychiatric: reports: Anxiety, Behavior disturbances





Physical Exam





- Vital Signs


Temperature: 97.3 F


Pulse Rate: 38


O2 Saturation: 99


Blood Pressure: 110/70





- Physical Exam


General Appearance: positive: Alert, Moderate distress


Eyes Bilateral: positive: EOMI, No lid inflammation, Conjunctivae nml, No 

scleral icterus


ENT: positive: No signs of dehydration, Purulent nasal drainage


Neck: positive: Thyroid nml, No JVD, Trachea midline


Cardiovascular: positive: Regular rate & rhythm, No murmur, No gallop


Respiratory: positive: No respiratory distress, Diminished in bases, Wheezes (

expiratory)


Skin: positive: Pressure wound (R hip, R lateral heel, R lateral malleolus, L 

medial malleolus)


Extremities: positive: No pedal edema, Other (feel wrapped in bandages)


Neurologic/Psychiatric: positive: Disoriented to place, Disoriented to time, 

Other (belligerent, upsets easily, frustrated by inability to make himself 

understood.)





Palliative Care





- POLST


Patient has POLST: Yes


POLST Status: DNR, Limited Interventions


Pain: Pain unchanged, Location (R side secondary to hemiparesis)


Tiredness/Fatigue: None


Drowsiness/Sedation: None


Nausea: None


Anxiety: Moderate (4-6)


Sleep: Sleeps well


Constipation: Yes, Unmanaged


Performance Status: 





Current level of functioning:  Mostly bedbound, needs 2 person transfer assist 

into his wheelchair. He could be a Pablo candidate. He refused Physical Therapy 

three times and was discharged from the service without therapy. Speech therapy

, but was evaluated at a distance and able to feed himself and swallow without 

choking. Being treated by Home Health wound care nurse for pressure ulcers due 

to lying on R side.





Palliative Care Status:  40% 








- Palliative Care


Discussion: 





Who is present:  The patient and myself.





Surrogate decision maker:  Spouse/DPOA Marlena Zelaya





Information preferences:  Communication through the son Anoop who will relay 

information to Marlena the spouse. Anoop works Fri-Mon evenings delivering 

petrol. Available daytimes Tues-Friday.





Most important goals:  Comfort, healing the pressure ulcers.





Patient/family concerns:  Anoop expressed concern about the hospital bed and 

AP mattress not being delivered. It ordered the beginning of November but had a 

paperwork delay and now Palliative Care team is following up with the DME 

supplier.





The other concern is the on going R side wounds that are non-healing largely 

due to the patient persistently lying on his right side. Facility is planning 

to rearrange his furniture and TV placement so that he doesn't have to lie on R 

side to watch it. Once the AP mattress and bed are delivered that should 

improve the healing process too.








Impression and Recommendations





- Palliative Care


Impression: 





This is an 86 year old gentleman with R side hemiparesis due to a CVA in 1991. 

He has vascular dementia with behavior issues, and aphasia, making it difficult 

if not impossible to understand his verbal communication. Today he did not have 

his writing board. He displays belligerent behaviors, in part due to 

frustration over communication and his pain and R side weakness. He is 

currently eating well, weight is fairly stable, and he does not currently meet 

Hospice criteria. He did not participate in PT and was discharged. He is mostly 

bedbound and at increased risk for infection and further skin breakdown. 

Palliative Care to continue with oversight and monitoring for transition to 

Hospice when appropriate.





Recommendations/Counseling Done: 





Dementia with behavior disturbance: Some improvement noted by Nursing. Seroquel 

25 mg bid.  He did not start Speech therapy, so they were not able to aid with 

communication.





Respiratory wheezes:  Ordered AP and lateral chest xray to rule out pneumonia.





Pressure ulcers, R hip and feet: Home Health nurses are managing his wound 

care.  Hospital bed and AP mattress delayed due to administration delay, 

Palliative Care team is following up. Facility is planning to rearrange his 

furniture and TV set to encourage him to not lie on R side.





R side hemiparesis:  He refused physical therapy sessions meant to increase 

strength and mobility.  Pain not improved.  Increased Norco 5/325 from once 

daily, to three times a day, routine. Also has Norco prn and Tylenol.





Constipation, opioid induced:  Continue Miralax 17g.  Added Senna 8.6mg daily.





Advanced care planning: POLST completed and on file. DNR and limited 

interventions.





Time Spent: 





45 minutes were spent with more than 50% of the time spent on counseling, 

education, and coordination of care regarding wounds, dementia, pain control, 

respiratory infection.

## 2017-12-08 ENCOUNTER — HOSPITAL ENCOUNTER (OUTPATIENT)
Dept: HOSPITAL 76 - LAB.R | Age: 82
Discharge: HOME | End: 2017-12-08
Attending: FAMILY MEDICINE
Payer: MEDICARE

## 2017-12-08 DIAGNOSIS — S71.001A: Primary | ICD-10-CM

## 2017-12-08 PROCEDURE — 87205 SMEAR GRAM STAIN: CPT

## 2017-12-08 PROCEDURE — 87070 CULTURE OTHR SPECIMN AEROBIC: CPT

## 2017-12-30 ENCOUNTER — HOSPITAL ENCOUNTER (OUTPATIENT)
Dept: HOSPITAL 76 - LAB.WCP | Age: 82
Discharge: HOME | End: 2017-12-30
Attending: NURSE PRACTITIONER
Payer: MEDICARE

## 2017-12-30 DIAGNOSIS — S71.002D: Primary | ICD-10-CM

## 2017-12-30 PROCEDURE — 87205 SMEAR GRAM STAIN: CPT

## 2017-12-30 PROCEDURE — 87070 CULTURE OTHR SPECIMN AEROBIC: CPT

## 2018-01-08 ENCOUNTER — HOSPITAL ENCOUNTER (OUTPATIENT)
Dept: HOSPITAL 76 - LAB.N | Age: 83
Discharge: HOME | End: 2018-01-08
Attending: FAMILY MEDICINE
Payer: MEDICARE

## 2018-01-08 DIAGNOSIS — S71.001D: Primary | ICD-10-CM

## 2018-01-08 PROCEDURE — 87070 CULTURE OTHR SPECIMN AEROBIC: CPT

## 2018-01-08 PROCEDURE — 87205 SMEAR GRAM STAIN: CPT

## 2018-01-11 ENCOUNTER — HOSPITAL ENCOUNTER (OUTPATIENT)
Dept: HOSPITAL 76 - LAB.R | Age: 83
Discharge: HOME | End: 2018-01-11
Attending: NURSE PRACTITIONER
Payer: MEDICARE

## 2018-01-11 DIAGNOSIS — L89.219: Primary | ICD-10-CM

## 2018-01-11 PROCEDURE — 87070 CULTURE OTHR SPECIMN AEROBIC: CPT

## 2018-01-11 PROCEDURE — 87205 SMEAR GRAM STAIN: CPT

## 2018-01-13 ENCOUNTER — HOSPITAL ENCOUNTER (OUTPATIENT)
Dept: HOSPITAL 76 - EMS | Age: 83
Discharge: ANOTHER HOSPITAL | End: 2018-01-13
Attending: SURGERY
Payer: MEDICARE

## 2018-01-13 ENCOUNTER — HOSPITAL ENCOUNTER (INPATIENT)
Dept: HOSPITAL 76 - ED | Age: 83
LOS: 3 days | Discharge: HOME | DRG: 871 | End: 2018-01-16
Attending: FAMILY MEDICINE | Admitting: SPECIALIST
Payer: MEDICARE

## 2018-01-13 DIAGNOSIS — I69.351: ICD-10-CM

## 2018-01-13 DIAGNOSIS — F43.23: ICD-10-CM

## 2018-01-13 DIAGNOSIS — I10: ICD-10-CM

## 2018-01-13 DIAGNOSIS — B37.2: ICD-10-CM

## 2018-01-13 DIAGNOSIS — Z79.82: ICD-10-CM

## 2018-01-13 DIAGNOSIS — R09.89: ICD-10-CM

## 2018-01-13 DIAGNOSIS — Z87.891: ICD-10-CM

## 2018-01-13 DIAGNOSIS — L89.519: ICD-10-CM

## 2018-01-13 DIAGNOSIS — L89.899: ICD-10-CM

## 2018-01-13 DIAGNOSIS — Z74.01: ICD-10-CM

## 2018-01-13 DIAGNOSIS — B95.62: ICD-10-CM

## 2018-01-13 DIAGNOSIS — A41.02: Primary | ICD-10-CM

## 2018-01-13 DIAGNOSIS — R53.2: ICD-10-CM

## 2018-01-13 DIAGNOSIS — Z90.79: ICD-10-CM

## 2018-01-13 DIAGNOSIS — R50.9: Primary | ICD-10-CM

## 2018-01-13 DIAGNOSIS — I95.9: ICD-10-CM

## 2018-01-13 DIAGNOSIS — L89.119: ICD-10-CM

## 2018-01-13 DIAGNOSIS — I69.398: ICD-10-CM

## 2018-01-13 DIAGNOSIS — R47.9: ICD-10-CM

## 2018-01-13 DIAGNOSIS — E78.5: ICD-10-CM

## 2018-01-13 DIAGNOSIS — L89.219: ICD-10-CM

## 2018-01-13 DIAGNOSIS — I69.320: ICD-10-CM

## 2018-01-13 DIAGNOSIS — D72.829: ICD-10-CM

## 2018-01-13 DIAGNOSIS — Z96.641: ICD-10-CM

## 2018-01-13 DIAGNOSIS — L08.9: ICD-10-CM

## 2018-01-13 DIAGNOSIS — Z90.49: ICD-10-CM

## 2018-01-13 LAB
ALBUMIN DIAFP-MCNC: 3.3 G/DL (ref 3.2–5.5)
ALBUMIN/GLOB SERPL: 1.1 {RATIO} (ref 1–2.2)
ALP SERPL-CCNC: 138 IU/L (ref 42–121)
ALT SERPL W P-5'-P-CCNC: 43 IU/L (ref 10–60)
ANION GAP SERPL CALCULATED.4IONS-SCNC: 12 MMOL/L (ref 6–13)
AST SERPL W P-5'-P-CCNC: 59 IU/L (ref 10–42)
BASOPHILS NFR BLD AUTO: 0.1 10^3/UL (ref 0–0.1)
BASOPHILS NFR BLD AUTO: 0.5 %
BILIRUB BLD-MCNC: 1 MG/DL (ref 0.2–1)
BUN SERPL-MCNC: 26 MG/DL (ref 6–20)
CALCIUM UR-MCNC: 9 MG/DL (ref 8.5–10.3)
CHLORIDE SERPL-SCNC: 105 MMOL/L (ref 101–111)
CO2 SERPL-SCNC: 21 MMOL/L (ref 21–32)
CREAT SERPLBLD-SCNC: 1.1 MG/DL (ref 0.6–1.2)
EOSINOPHIL # BLD AUTO: 0.2 10^3/UL (ref 0–0.7)
EOSINOPHIL NFR BLD AUTO: 0.8 %
ERYTHROCYTE [DISTWIDTH] IN BLOOD BY AUTOMATED COUNT: 15.1 % (ref 12–15)
GFRSERPLBLD MDRD-ARVRAT: 63 ML/MIN/{1.73_M2} (ref 89–?)
GLOBULIN SER-MCNC: 3.1 G/DL (ref 2.1–4.2)
GLUCOSE SERPL-MCNC: 118 MG/DL (ref 70–100)
HGB UR QL STRIP: 11.1 G/DL (ref 14–18)
INFLUENZA A & B AG INTERPRET: (no result)
LIPASE SERPL-CCNC: 12 U/L (ref 22–51)
LYMPHOCYTES # SPEC AUTO: 2.5 10^3/UL (ref 1.5–3.5)
LYMPHOCYTES NFR BLD AUTO: 12.2 %
MAGNESIUM SERPL-MCNC: 1.8 MG/DL (ref 1.7–2.8)
MCH RBC QN AUTO: 29.4 PG (ref 27–31)
MCHC RBC AUTO-ENTMCNC: 32 G/DL (ref 32–36)
MCV RBC AUTO: 91.9 FL (ref 80–94)
MONOCYTES # BLD AUTO: 1.1 10^3/UL (ref 0–1)
MONOCYTES NFR BLD AUTO: 5.2 %
NEUTROPHILS # BLD AUTO: 16.5 10^3/UL (ref 1.5–6.6)
NEUTROPHILS # SNV AUTO: 20.3 X10^3/UL (ref 4.8–10.8)
NEUTROPHILS NFR BLD AUTO: 81.3 %
PDW BLD AUTO: 8.1 FL (ref 7.4–11.4)
PLAT MORPH BLD: (no result)
PLATELET # BLD: 233 10^3/UL (ref 130–450)
PLATELET BLD QL SMEAR: (no result)
PROT SPEC-MCNC: 6.4 G/DL (ref 6.7–8.2)
RBC MAR: 3.77 10^6/UL (ref 4.7–6.1)
RBC MORPH BLD: (no result)
SODIUM SERPLBLD-SCNC: 138 MMOL/L (ref 135–145)

## 2018-01-13 PROCEDURE — 96365 THER/PROPH/DIAG IV INF INIT: CPT

## 2018-01-13 PROCEDURE — 99283 EMERGENCY DEPT VISIT LOW MDM: CPT

## 2018-01-13 PROCEDURE — 87086 URINE CULTURE/COLONY COUNT: CPT

## 2018-01-13 PROCEDURE — 87150 DNA/RNA AMPLIFIED PROBE: CPT

## 2018-01-13 PROCEDURE — 87275 INFLUENZA B AG IF: CPT

## 2018-01-13 PROCEDURE — 87276 INFLUENZA A AG IF: CPT

## 2018-01-13 PROCEDURE — 99285 EMERGENCY DEPT VISIT HI MDM: CPT

## 2018-01-13 PROCEDURE — 99284 EMERGENCY DEPT VISIT MOD MDM: CPT

## 2018-01-13 PROCEDURE — 83605 ASSAY OF LACTIC ACID: CPT

## 2018-01-13 PROCEDURE — 36415 COLL VENOUS BLD VENIPUNCTURE: CPT

## 2018-01-13 PROCEDURE — 96361 HYDRATE IV INFUSION ADD-ON: CPT

## 2018-01-13 PROCEDURE — 80048 BASIC METABOLIC PNL TOTAL CA: CPT

## 2018-01-13 PROCEDURE — 83880 ASSAY OF NATRIURETIC PEPTIDE: CPT

## 2018-01-13 PROCEDURE — 81003 URINALYSIS AUTO W/O SCOPE: CPT

## 2018-01-13 PROCEDURE — 81001 URINALYSIS AUTO W/SCOPE: CPT

## 2018-01-13 PROCEDURE — 83735 ASSAY OF MAGNESIUM: CPT

## 2018-01-13 PROCEDURE — 85025 COMPLETE CBC W/AUTO DIFF WBC: CPT

## 2018-01-13 PROCEDURE — 83690 ASSAY OF LIPASE: CPT

## 2018-01-13 PROCEDURE — 80053 COMPREHEN METABOLIC PANEL: CPT

## 2018-01-13 PROCEDURE — 87040 BLOOD CULTURE FOR BACTERIA: CPT

## 2018-01-13 PROCEDURE — 71045 X-RAY EXAM CHEST 1 VIEW: CPT

## 2018-01-13 RX ADMIN — NYSTATIN SCH APPLIC: 100000 POWDER TOPICAL at 21:15

## 2018-01-13 RX ADMIN — SODIUM CHLORIDE SCH MLS/HR: 9 INJECTION, SOLUTION INTRAVENOUS at 18:48

## 2018-01-13 RX ADMIN — SODIUM CHLORIDE, PRESERVATIVE FREE SCH: 5 INJECTION INTRAVENOUS at 22:53

## 2018-01-13 NOTE — XRAY PRELIMINARY REPORT
Accession: [id number]

Exam: XR CHEST 1 VIEW X-RAY

 

IMPRESSION: 

1. New very mild left basilar atelectasis or infiltrate. 

2. Thoracic aortic ectasia, without change.

 

[facility]

 

SITE ID: [id number]

## 2018-01-13 NOTE — XRAY REPORT
EXAM: 

CHEST RADIOGRAPHY

 

EXAM DATE: 1/13/2018 03:03 PM.

 

CLINICAL HISTORY: Fever and low blood pressure.

 

COMPARISON: 11/11/2017.

 

TECHNIQUE: 1 view.

 

FINDINGS:

Lungs/Pleura: Patient rotation to the right. New minimal left basilar atelectasis or possible infiltr
ate. No dense consolidation. No pleural effusion or pneumothorax.

 

Mediastinum: Normal heart size. Thoracic aortic ectasia and calcifications, without change.

 

Other: None.

 

IMPRESSION: 

1. New very mild left basilar atelectasis or infiltrate. 

2. Thoracic aortic ectasia, without change.

 

[facility]

Referring Provider Line: [phone]

 

SITE ID: [id number]

## 2018-01-13 NOTE — ED PHYSICIAN DOCUMENTATION
PD HPI ABD PAIN





- Stated complaint


Stated Complaint: FEVER





- History obtained from


History obtained from: Patient, EMS, Caregiver





- History of Present Illness


Timing - onset: Today (he is having feeling of general weakness today. Noted to 

have fever to 101, fast heart rate. Being treated for foot infection that has 

culture from 1/8/18 showing MRSA.)


Timing - details: Gradual onset (he was having increased weakness and noted to 

have fever and low blood pressure today. Has been having skin infections of 

foot and hip and forearm. Recent cultures 1/8/18 were negative of hip but has 

MRSA of foot. He was Rx Augmentin on 1/12 for that (interesting to Rx augmentin 

for MRSA, as would not cover it). Having temp to 101 today and BP low. His wife 

however says that his BP is commonly around or below 100 systolic, so not too 

unusual.)


Associated symptoms: Fever, Nausea, Other (His wife says she just finished 

having URI symptoms 3-4 days ago, was sick for several days.).  No: Vomiting, 

Diarrhea


Similar symptoms before: Has not had sx before


Recently seen: Clinic (for care of skin wounds.)





Review of Systems


Constitutional: reports: Fever.  denies: Chills, Myalgias


Nose: denies: Rhinorrhea / runny nose, Congestion


Throat: denies: Sore throat


Cardiac: denies: Chest pain / pressure, Palpitations, Pedal edema, Calf pain


Respiratory: denies: Cough


GI: reports: Nausea.  denies: Abdominal Pain, Vomiting, Diarrhea


: reports: Incontinent.  denies: Dysuria


Skin: denies: Rash


Musculoskeletal: denies: Back pain


Neurologic: reports: Focal weakness (from prior CVA.).  denies: Headache


Endocrine: denies: Weight loss


Immunocompromised: denies: Immunocompromised





PD PAST MEDICAL HISTORY





- Past Medical History


Neuro: CVA


: Incontinence





- Past Surgical History


Past Surgical History: Yes


General: Cholecystectomy


HEENT: Tonsil/Adenoidectomy





- Present Medications


Home Medications: 


 Ambulatory Orders











 Medication  Instructions  Recorded  Confirmed


 


Lisinopril [Zestril] 20 mg PO DAILY 08/14/16 01/13/18


 


Polyethylene Glycol 3350 [Miralax] 17 gm PO DAILY 11/01/17 01/13/18


 


QUEtiapine [SEROquel] 25 mg PO BID 11/01/17 01/13/18


 


Senna [Senokot] 8.6 mg PO BID 12/07/17 01/13/18


 


Acetaminophen 650 mg PO TID PRN 01/13/18 01/13/18


 


Aspirin [Aspirin EC] 81 mg PO DAILY 01/13/18 01/13/18


 


Atorvastatin Calcium 40 mg PO QPM 01/13/18 01/13/18


 


HYDROcod/ACETAM 5/325 [Norco 5/325] 1 tab PO TID 01/13/18 01/13/18


 


Multivitamin [Theragran] 1 tab PO DAILY 01/13/18 01/13/18


 


Nitroglycerin 0.2 mg/Hr Patch 1 patch TOP DAILY 01/13/18 01/13/18





[Nitro-Dur]   


 


Nystatin [Nystop] 1 applic TOP BID 01/13/18 01/13/18














- Allergies


Allergies/Adverse Reactions: 


 Allergies











Allergy/AdvReac Type Severity Reaction Status Date / Time


 


No Known Drug Allergies Allergy   Verified 01/13/18 14:42














- Social History


Does the pt smoke?: No


Smoking Status: Never smoker


Does the pt drink ETOH?: No


Does the pt have substance abuse?: No





- Family History


Family history: reports: Non contributory





- Immunizations


Immunizations are current?: Yes





- POLST


Patient has POLST: Yes





PD ED PE NORMAL





- Vitals


Vital signs reviewed: Yes





- General


General: Alert and oriented X 3, No acute distress (he does not seem 

uncomfortable. ), Well developed/nourished





- HEENT


HEENT: Ears normal, Pharynx benign





- Neck


Neck: Supple, no meningeal sign, No adenopathy





- Cardiac


Cardiac: RRR (borderline tachycardia), No murmur





- Respiratory


Respiratory: Clear bilaterally





- Abdomen


Abdomen: Normal bowel sounds, Soft, Non tender, Non distended





- Male 


Male : Deferred





- Rectal


Rectal: Deferred





- Back


Back: No CVA TTP





- Derm


Derm: Normal color, Warm and dry, Other (skin dressings on right forearm, right 

gluteal, right foot - without surrounding redness nor streaking. The foot wound 

is tender. Others are not. )





- Extremities


Extremities: Other (right hip and right forearm with bandages intact, without 

redness around them. Left in place for now. Right foot with wrapped wound, 

which is tender, and has some redness around edges. No proximal streaking. )





- Neuro


Neuro: Alert and oriented X 3, Other (right sided weakness from prior CVA. )


Eye Opening: Spontaneous


Motor: Obeys Commands


Verbal: Oriented


GCS Score: 15





- Psych


Psych: Normal mood





Results





- Vitals


Vitals: 


 Vital Signs - 24 hr











  01/13/18 01/13/18 01/13/18





  14:33 14:49 15:07


 


Temperature 37.0 C 37.1 C 


 


Heart Rate 100 96 97


 


Respiratory 20 20 21





Rate   


 


Blood Pressure 74/48 L 79/67 L 83/62 L


 


O2 Saturation 99 98 99








 Oxygen











O2 Source                      Nasal cannula

















- Labs


Labs: 


 Laboratory Tests











  01/13/18 01/13/18 01/13/18





  14:50 15:32 15:32


 


WBC   20.3 H 


 


RBC   3.77 L 


 


Hgb   11.1 L 


 


Hct   34.6 L 


 


MCV   91.9 


 


MCH   29.4 


 


MCHC   32.0 


 


RDW   15.1 H 


 


Plt Count   233 


 


MPV   8.1 


 


Neut #   16.5 H 


 


Lymph #   2.5 


 


Mono #   1.1 H 


 


Eos #   0.2 


 


Baso #   0.1 


 


Absolute Nucleated RBC   0.00 


 


Nucleated RBC %   0.0 


 


Manual Slide Review   Indicated 


 


Sodium    138


 


Potassium    4.0


 


Chloride    105


 


Carbon Dioxide    21


 


Anion Gap    12.0


 


BUN    26 H


 


Creatinine    1.1


 


Estimated GFR (MDRD)    63 L


 


Glucose    118 H


 


Lactic Acid   


 


Calcium    9.0


 


Magnesium    1.8


 


Total Bilirubin    1.0


 


AST    59 H


 


ALT    43


 


Alkaline Phosphatase    138 H


 


B-Natriuretic Peptide   


 


Total Protein    6.4 L


 


Albumin    3.3


 


Globulin    3.1


 


Albumin/Globulin Ratio    1.1


 


Lipase    12 L


 


Influenza A (Rapid)  Negative  


 


Influenza B (Rapid)  Negative  


 


Influenza Types A,B Ag  -  














  01/13/18 01/13/18





  15:32 15:32


 


WBC  


 


RBC  


 


Hgb  


 


Hct  


 


MCV  


 


MCH  


 


MCHC  


 


RDW  


 


Plt Count  


 


MPV  


 


Neut #  


 


Lymph #  


 


Mono #  


 


Eos #  


 


Baso #  


 


Absolute Nucleated RBC  


 


Nucleated RBC %  


 


Manual Slide Review  


 


Sodium  


 


Potassium  


 


Chloride  


 


Carbon Dioxide  


 


Anion Gap  


 


BUN  


 


Creatinine  


 


Estimated GFR (MDRD)  


 


Glucose  


 


Lactic Acid   1.1


 


Calcium  


 


Magnesium  


 


Total Bilirubin  


 


AST  


 


ALT  


 


Alkaline Phosphatase  


 


B-Natriuretic Peptide  57 


 


Total Protein  


 


Albumin  


 


Globulin  


 


Albumin/Globulin Ratio  


 


Lipase  


 


Influenza A (Rapid)  


 


Influenza B (Rapid)  


 


Influenza Types A,B Ag  














- Rads (name of study)


  ** chest


Radiology: Prelim report reviewed, EMP read contemporaneously (somewhat wide 

mediastinum, similar to prior. No infiltrates. )





PD MEDICAL DECISION MAKING





- ED course


Complexity details: considered differential (He looks alert and good color, 

interacts well. Some speech problems due to prior CVA. Wife says his BP is 

commonly low. I think this is true, as opposed to being very septic. However 

would want to treat for possible MRSA sepsis until better evaluated. Also to 

check for other infections such as pneumonia, UTI, flu, viral illness. ), d/w 

patient, d/w consultant (Nilson, hospitalist)





Departure





- Departure


Disposition: [facility] DC/Xfer


Clinical Impression: 


 Staphylococcal infection of skin, SIRS (systemic inflammatory response syndrome

)





Leukocytosis


Qualifiers:


 Leukocytosis type: unspecified Qualified Code(s): D72.829 - Elevated white 

blood cell count, unspecified





Hypotension


Qualifiers:


 Hypotension type: unspecified hypotension type Qualified Code(s): I95.9 - 

Hypotension, unspecified





Condition: Stable


Record reviewed to determine appropriate education?: Yes

## 2018-01-14 LAB
CLARITY UR REFRACT.AUTO: (no result)
GLUCOSE UR QL STRIP.AUTO: NEGATIVE MG/DL
KETONES UR QL STRIP.AUTO: NEGATIVE MG/DL
NITRITE UR QL STRIP.AUTO: NEGATIVE
PH UR STRIP.AUTO: 6 PH (ref 5–7.5)
PROT UR STRIP.AUTO-MCNC: NEGATIVE MG/DL
RBC # UR STRIP.AUTO: (no result) /UL
RBC # URNS HPF: (no result) /HPF (ref 0–5)
SP GR UR STRIP.AUTO: 1.02 (ref 1–1.03)
SQUAMOUS URNS QL MICRO: (no result)
UROBILINOGEN UR QL STRIP.AUTO: (no result) E.U./DL
UROBILINOGEN UR STRIP.AUTO-MCNC: NEGATIVE MG/DL
WBC CLUMPS URNS QL MICRO: PRESENT

## 2018-01-14 RX ADMIN — SODIUM CHLORIDE, PRESERVATIVE FREE SCH: 5 INJECTION INTRAVENOUS at 21:40

## 2018-01-14 RX ADMIN — OXYCODONE PRN MG: 5 TABLET ORAL at 12:19

## 2018-01-14 RX ADMIN — SODIUM CHLORIDE, PRESERVATIVE FREE SCH: 5 INJECTION INTRAVENOUS at 07:11

## 2018-01-14 RX ADMIN — SODIUM CHLORIDE, PRESERVATIVE FREE PRN ML: 5 INJECTION INTRAVENOUS at 16:48

## 2018-01-14 RX ADMIN — OXYCODONE PRN MG: 5 TABLET ORAL at 04:00

## 2018-01-14 RX ADMIN — SODIUM CHLORIDE, PRESERVATIVE FREE SCH: 5 INJECTION INTRAVENOUS at 16:48

## 2018-01-14 RX ADMIN — SODIUM CHLORIDE SCH MLS/HR: 9 INJECTION, SOLUTION INTRAVENOUS at 18:56

## 2018-01-14 RX ADMIN — OXYCODONE PRN MG: 5 TABLET ORAL at 07:59

## 2018-01-14 RX ADMIN — SODIUM CHLORIDE SCH MLS/HR: 9 INJECTION, SOLUTION INTRAVENOUS at 05:34

## 2018-01-14 RX ADMIN — NYSTATIN SCH: 100000 POWDER TOPICAL at 09:29

## 2018-01-14 RX ADMIN — VANCOMYCIN HCL-SODIUM CHLORIDE IV SOLN 1.5 GM/250ML-0.9% SCH MLS/HR: 1.5-0.9/25 SOLUTION at 20:29

## 2018-01-14 RX ADMIN — VANCOMYCIN HCL-SODIUM CHLORIDE IV SOLN 1.5 GM/250ML-0.9% SCH MLS/HR: 1.5-0.9/25 SOLUTION at 07:57

## 2018-01-14 RX ADMIN — SODIUM CHLORIDE SCH MLS/HR: 900 INJECTION INTRAVENOUS at 17:32

## 2018-01-14 RX ADMIN — SODIUM CHLORIDE SCH MLS/HR: 9 INJECTION, SOLUTION INTRAVENOUS at 16:47

## 2018-01-14 RX ADMIN — DIAZEPAM SCH MG: 5 INJECTION, SOLUTION INTRAMUSCULAR; INTRAVENOUS at 01:45

## 2018-01-14 RX ADMIN — OXYCODONE PRN MG: 5 TABLET ORAL at 21:44

## 2018-01-14 RX ADMIN — NYSTATIN SCH APPLIC: 100000 POWDER TOPICAL at 20:32

## 2018-01-14 RX ADMIN — POLYETHYLENE GLYCOL 3350 SCH: 17 POWDER, FOR SOLUTION ORAL at 09:30

## 2018-01-14 NOTE — PROVIDER PROGRESS NOTE
Subjective





- Prog Note Date


Prog Note Date: 01/14/18


Prog Note Time: 08:13





- Subjective


Subjective: 





overnight he required another liter fluid bolus for hypotension but no other 

problems. azithromycin was stopped by [facility] for "prolonged QT" and 

he did receive his Vancomycin in ER but not on floor. This morning's RN reports 

he seems much calmer and more oriented. Answers yes/no appropriately. Not 

glaring at everyone and is not yelling "bullshit". His main complaint is the 

pain in right leg and right achilles and heel and malleoli. 





So far he has not had any fevers. Still occ cough. Ate 100% of breakfast.











Current Medications





- Current Medications


Current Medications: 








Active Medications





Acetaminophen (Tylenol)  650 mg PO Q4HR PRN


   PRN Reason: Pain 1 to 4


Sodium Chloride (Normal Saline 0.9%)  1,000 mls @ 100 mls/hr IV .Q10H [facility]


   Last Infusion: 01/14/18 06:00 Dose:  100 mls/hr


Azithromycin 500 mg/ Sodium (Chloride)  250 mls @ 250 mls/hr IV Q24H [facility]


Vancomycin/Sodium Chloride (Vanco/Sod Chloride 0.9%)  1.5 gm in 500 mls @ 325 

mls/hr IV Q12H [facility]


   Last Admin: 01/14/18 07:57 Dose:  325 mls/hr


Ceftriaxone Sodium 2 gm/ (Sodium Chloride)  100 mls @ 200 mls/hr IV Q24H [facility]


Nystatin (Nystop)  1 applic TOP BID [facility]


   Last Admin: 01/13/18 21:15 Dose:  1 applic


Ondansetron HCl (Zofran Inj)  4 mg IVP Q6HR PRN


   PRN Reason: Nausea / Vomiting


Ondansetron HCl (Zofran Odt)  4 mg TL Q6HR PRN


   PRN Reason: Nausea / Vomiting


Oxycodone HCl (Roxicodone)  5 mg PO Q4HR PRN


   PRN Reason: Pain 5 to 7


   Last Admin: 01/14/18 07:59 Dose:  5 mg


Polyethylene Glycol (Miralax)  17 gm PO DAILY [facility]


Quetiapine Fumarate (Seroquel)  25 mg PO BID [facility]


   Last Admin: 01/13/18 21:08 Dose:  25 mg


Sodium Chloride (Normal Saline Flush 0.9%)  10 ml IVP PRN PRN


   PRN Reason: AS NEEDED PER PROVIDER ORDERS


Sodium Chloride (Normal Saline Flush 0.9%)  10 ml IVP Q8HR [facility]


   Last Admin: 01/14/18 07:11 Dose:  Not Given





 





Lisinopril [Zestril] 20 mg PO DAILY 08/14/16 


Polyethylene Glycol 3350 [Miralax] 17 gm PO DAILY 11/01/17 


QUEtiapine [SEROquel] 25 mg PO BID 11/01/17 


Senna [Senokot] 8.6 mg PO BID 12/07/17 


Acetaminophen 650 mg PO TID PRN 01/13/18 


Aspirin [Aspirin EC] 81 mg PO DAILY 01/13/18 


Atorvastatin Calcium 40 mg PO QPM 01/13/18 


HYDROcod/ACETAM 5/325 [Norco 5/325] 1 tab PO TID 01/13/18 


Multivitamin [Theragran] 1 tab PO DAILY 01/13/18 


Nitroglycerin 0.2 mg/Hr Patch [Nitro-Dur] 1 patch TOP DAILY 01/13/18 


Nystatin [Nystop] 1 applic TOP BID 01/13/18 











Objective





- Vital Signs/Intake & Output


Reviewed Vital Signs: Yes


Vital Signs: 





 Vital Signs











  Pulse Resp BP Pulse Ox


 


 01/14/18 07:00  71  17  89/50 L  96


 


 01/14/18 05:00  71  16  76/62 L  95











Intake & Output: 





 Intake & Output











 01/11/18 01/12/18 01/13/18 01/14/18





 23:59 23:59 23:59 23:59


 


Intake Total   4126 973.333


 


Output Total    0


 


Balance   4126 973.333














- Objective


General Appearance: positive: No acute distress, Alert, Other (aphasic elderly 

man with most of weight in abd and chest, and wasted leg muscle mass, wearing 

glasses)


Eyes Bilateral: positive: PERRL, EOMI


ENT: positive: Other (slght facial droop).  negative: Oral lesions


Neck: positive: No JVD.  negative: Lymphadenopathy (R), Lymphadenopathy (L), 

Stiff neck, Carotid bruit


Respiratory: positive: Chest non-tender, No respiratory distress (comfortable), 

Other (wide AP diameter, coarse tubular congested sounds).  negative: Wheezes, 

Rales, Rhonchi


Cardiovascular: positive: Systolic murmur.  negative: Gallop/S4, Friction rub


Abdomen: positive: Non-tender, No organomegaly, Nml bowel sounds, No distention


Skin: positive: Warm, Dry, Other (reddened inguinal folds, ecchymoses. He 

really hurts when we touch his leg, anywhere)


Extremities: positive: No pedal edema.  negative: Full ROM (right side plegia)


Neurologic/Psychiatric: positive: Oriented x3 (this am), CN's nml (2-12) (

except deaf, and ?right facial).  negative: Motor nml (right sided plegia)





- Lab Results


Fish Bones: 


 01/13/18 15:32





 01/13/18 15:32





Assessment/Plan





- Problem List


(1) Fever


Impression: 


he presented with no antecedent illness other than his wife had a cold this 

last week. He was weaker but really no source on history.  He has a mild cough. 

Exam no contributory other than change in mentation:more angry. Had elevated 

WBC to 20K. CXR was with scant, scant infiltrate. this is in a man who lives in 

[facility] with MRSA on body wounds. So far, he has not had any fever while here. 





Plan:


treat as pneumonia with broad spectrum abx, Day #1 azithromycin since overnight 

pharmacy cancelled, Day #2 Vancomycin, Rocephin


review blood culture results when available


UA for completeness sake even though received abx.


check WBC in am.


Qualifiers: 


   Fever type: due to other condition   Qualified Code(s): R50.81 - Fever 

presenting with conditions classified elsewhere   





(2) Hypotension


Impression: 


as with above, being treated as part of infection/SIRS but I am not convinced 

it was the scant infiltrate on CXR. 


Bp has improved to 90 systolic from as low at 70's systolic. Baseline is 100-

108. So far it's been fluid bolus with NS.


Creat is stable


Mentation improved. 





Plan: 


no pressors for now.





Qualifiers: 


   Hypotension type: unspecified hypotension type   Qualified Code(s): I95.9 - 

Hypotension, unspecified   





(3) Decubitus skin ulcer


Impression: 


multiple areas of skin breakdown from laying on right side and he's resistant 

to frequent positioning.


so far nurses have gotten him to lay on his back, not on his right.





Plan:


wound care RN when availabel


continue with current RN policy treatment


Qualifiers: 


   Pressure ulcer location: ankle 





(4) Complete immobility due to severe physical disability or frailty


Impression: 


with hemiplegia and hemiparesis follwoing stroke from 1991. 





Plan:


see if he will work with PT.

## 2018-01-14 NOTE — HISTORY & PHYSICAL EXAMINATION
DATE OF SERVICE:

Physician: Leisa Devine MD

 

DATE OF ADMISSION:  2018

 

PRIMARY CARE PROVIDER:  Verito Blandon

 

ADMITTING PROVIDER:  Dr. Leisa Devine.

 

CHIEF COMPLAINT:  Fever.

 

HISTORY OF PRESENT ILLNESS:  This patient is an 86-year-old gentleman who 
unfortunately 

suffered a catastrophic stroke in .  It left him with a dense hemiplegia on 
the right side 

of his body.  His right leg is completely nonfunctional.  He prefers to lie on 
that right side 

and unfortunately has developed numerous decubitus ulcers, anywhere from the 
shoulder to the 

right hip, to the right knee, right ankle.  Some have grown out MRSA.  He has 
been seen by 

palliative care because of a severe functional decline over the last few 
months.  The gentleman

is a fiercely strong person.  His son describes him as "able to do anything 
before his stroke."

This has left him angry, and probably depressed.  He was in his usual state of 
health.  There 

was no specific change with regard to fever, chills, rhinorrhea.  His wife had 
a cold this last

week.  Today, the staff at his assisted living facility, [facility], found him to 
have a fever.  

With that he started to have generalized weakness.  The only new thing that 
happened was being 

treated for MRSA growing out of one of his wounds on 2018.  The treatment 
with Augmentin.

 Blood pressure started getting low with this.  His wife states that his blood 
pressure usually

is low and "a number of 100 systolic."  In looking at his primary care provider'
s office notes 

blood pressures have been 106/64, 100/66, and 116/68.  The highest he was in 
2016 when he was 

112/70.

 

He was brought to the emergency room and evaluated by Dr. Reyes.  There were 
no complaints of

upper respiratory tract symptoms.  No chest pain or cough.  Temperature was 37.
  Blood pressure

was 95/53.  He did have an elevated white cell count of 20.3 with a definite 
left shift.  He 

also had reactive lymphs.  Chest x-ray showed a scant left infiltrate and 
urinalysis was not 

done.  His flu screen was negative.  Dr. Reyes felt he may have an early 
systemic 

inflammatory response, and wanted the patient was brought in for 
hospitalization.

 

PAST MEDICAL HISTORY

1.  Catastrophic hemorrhagic stroke in .  He was hospitalized at [facility].  He was 

active [employer] at the time.  It has left him with a devastating right 
hemiplegia.  He is 

described as alert and oriented, for the most part.  There seems to be gradual 
cognitive 

decline this last year that the wife is not acknowledging.  However, son and 
palliative care 

consults feel the patient may be developing dementia.

2.  Hypertension.

3.  Hyperlipidemia.

4.  Ex-tobacco abuse of a 4 pack a day habit.

5.  Spinal stenosis with spinal surgery in , .

6.  Osteoarthritis with right hip replacement in .

7.  Benign prostatic hypertrophy with prostatectomy .

8.  Status post cholecystectomy .

 

ALLERGIES:  NO KNOWN DRUG ALLERGIES.

 

MEDICATIONS

At [facility]:

1.  Acetaminophen 650 mg p.o. t.i.d.

2.  Aspirin 81 mg p.o. daily.

3.  Atorvastatin 40 mg p.o. daily.

4.  Norco 5/325 one tablet p.o. t.i.d.

5.  Zestril 20 mg p.o. daily.

6.  Theragran vitamin 1 p.o. daily.

7.  Nitroglycerin patch 0.2 daily.

8.  Nystatin topically to Candida intertrigo b.i.d.

9.  MiraLax 17 grams daily.

10.  Seroquel 25 mg p.o. b.i.d.

11.  Senna 8.6 mg p.o. b.i.d.

 

SOCIAL HISTORY:  He was born in [place], [place].  He was the highest 
ranking enlisted 

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  Again, his son describes him 
as a vibrant 

personality with a strong forceful work ethic.  The stroke has really 
devastated him and the 

family.  He has lived in [place] almost half of his life.  He had retired to 
[place] 

and was living there.  His son, who lives here on [facility], got him to 
move to 2-1/2 

years ago from [place] to be closer to him.  It was really difficult to 
take care of them.

 They moved in to [facility].  They hated it.  Then they bought themselves a 1 
level 3 bedroom

home, but mom really could not handle taking care of the house and taking care 
of her . 

So, they moved into [facility] in 2017.  He smoked 4 packs per day, 
starting at the age of 

18 or 19.  He has no history of alcohol abuse.

 

FAMILY HISTORY:  Mom and dad both  of old age in their 70s.  He did have 1 
sister who  

of an unknown type of cancer.  He did have 2 sons.  However, 1 son, who they 
did not speak to 

even though he lived a mile from them in [place],  of complications of 
mild substance 

abuse, cigarette smoking, alcoholism.  Most likely it was ulcer disease with 
hemorrhagic 

complications.  The one surviving son, who lives here in [place], describes 
himself as 

healthy.

 

REVIEW OF SYSTEMS

GENERAL:  He is an elderly gentleman who has gotten angry as time has gone on 
because of the 

stroke.  While he is described as alert and oriented and can answer yes/no 
questions, in the 

recent last 4 months he has gotten belligerent about having to answer things.  
So, it is 

unclear how much his orientation is.  He displays behavior such as verbal 
belligerence and 

aggressiveness, shouting "bull shit." He was placed on Seroquel in October, and 
with the follow

up visit in December he seems to have improved.

HEAD AND NECK:  He has deafness, full upper and lower dentures.  They do not 
describe choking, 

or dysphagia.

PULMONARY:  There is no chronic cough or wheezing in spite of his severe 
smoking history.  No 

hemoptysis.  No use of nebulizers or oxygen.

CARDIOVASCULAR:  No history of MI, angina, stenting, arrhythmia, valvular heart 
disease, 

orthopnea or edema.

GASTROINTESTINAL: Severe constipation, but no recent change in habits.  No 
blood in stool or 

diarrhea.

GENITOURINARY:  Incontinent of urine.  Wears a diaper.

DERMATOLOGIC:  Chronic right-sided body skin ulcers because of lying on his 
right side.

MUSCULOSKELETAL:  Muscle aches, right side of his body is starting to contract 
because of 

limited range of motion from his right hemiparesis after many years.  Son 
describes his upper 

body as sometimes occasionally will tremble with an effort to move, but the 
right leg is 

completely useless.

CNS:  The above musculoskeletal limitations, speech is with aphasia.  Very 
limited ability to 

verbalize other than "bull shit," and sometimes the word yes or no.  He has 
anxiety, gets 

easily angry.  Again, he is disoriented to person, place and demonstrated that 
with his 

palliative care consult, but is noted to be oriented to person, time and place 
with Dr. Reyes

today.

 

On examination, I have transferred the patient from the ER to ICU because of 
hypotension.  In 

the ER, he got as low as 74/32 and received a liter of fluid bolus.  We have 
given him a second

one.  He is now 102/53.  True to form he is in the ICU bed, with lips pursed, 
scowling,  

yelling at the nurses and aides, "bull shit." He is not happy to be here and he 
wants them to 

stop touching him.

 

PHYSICAL EXAMINATION

VITAL SIGNS:  Pressure is 102/53. Temperature is 37.2.  Pulse is in the 70s and 
sinus.  He is 

96 percent on 4 liters.  There is no note in the emergency room, but he was on 
room air.

HEAD AND NECK:  Shows him to be wearing glasses, slightly deaf, balding, slight 
right facial 

droop.  Dentures in place. Neck had shotty adenopathy, not stiff.  No carotid 
bruits.

LUNGS:  Coarse upper airway sounds.  He has increased AP diameter.  Slightly 
prolonged on 

exhalation, but really no severe tachypnea or respiratory effort.  He does not 
have crackles, 

rhonchi or wheezing.

HEART:  PMI is normally placed with a regular rate and rhythm and a soft 
systolic ejection 

murmur at the left lower sternal border.  No rubs or gallops.

ABDOMEN:  Protuberant, obese, nontender.  No organomegaly.  Hypoactive bowel 
sounds.  He is 

nontender.  No rebound or guarding but with his left hand he keeps on trying to 
grab my hands 

because he does not like me examining his belly.  When I keep on asking him if 
he is in pain.  

He just keeps on scowling at me and saying "bull shit." He has minimal Candida 
intertrigo in 

his abdominal folds and groin folds.  The extremities are markedly shrunken 
with decreased 

muscle mass of both legs, not just the right leg.  There is no clubbing, 
cyanosis or edema.

SKIN:  Does show a bandage on his right lateral deltoid of his shoulder, the 
right hip.  I am 

not noticing any skin breakdown of his gluteal fold, or his malleoli.

NEUROLOGIC:  The gentleman was oriented to that he is in the hospital, but he 
really won't 

cooperate to tell me if he knows the date or which hospital or what town.  He 
is angry.  He has

a right hemiplegia, and expressive aphasia, but it is reactive enough to watch 
me walk in the 

room, utter his words, and I can definitely see what emotion he is having.  He 
appears quite 

alert and very interactive with the nursing staff.

 

LABORATORY: Again, rapid influenza screen is negative. White cell count is 
20.3.  Hemoglobin 

11.1, hematocrit 34.6.  CMP shows a mildly elevated BUN at 26, creatinine 1.1.  
Random glucose 

118.  Total bilirubin 1, AST 59, ALT 43.  Lipase less than 12.  Lactic acid 
1.1.  Chest x-ray 

is with left bibasilar atelectasis or infiltrate that is very mild.  Thoracic 
aortic ectasia.

 

Again, urinalysis was not done in the emergency room before antibiotics were 
started.  An EKG 

was not done as well as troponin not done.

 

ASSESSMENT AND PLAN

1.  Subjective fever in the outpatient setting.  Accompanying that has been no 
change in mental

status.  He does have an elevated white cell count.  At this time, the only 
source of the 

possible subjective fever that we can find is chest x-ray.  Unfortunately, UA 
will not be 

helpful since antibiotics were already given.  His abdominal exam appears 
benign at this time. 

We will treat empirically for pneumonia and add vancomycin because of his MRSA 
positive status.

2.  Hypotension.  It is so difficult to decide if this patient  really has SIRS 
criteria with 

hypotension from sepsis, or he just has low blood pressure at this time.  He is 
not showing any

signs of mottling, his BUN and creatinine are acceptable for someone who is 
supposed to be with

hypotension and I would think it would cause prerenal azotemia.  His 
hypotension as well 

tolerated and we will treat with simple IV boluses.  We will place him in the 
ICU for close 

one-to-one observation.

3.  History of skin breakdown on the right side of his body.  I will have 
nursing take pictures

and document sizes of lesions.  Instead of Augmentin I would recommend he go 
home on 

clindamycin or Bactrim or doxycycline.

4.  Adjustment disorder with depression.  Long-term considering his stroke was 
in .  Not on

any true antidepressants, but recently started on Seroquel for behavioral 
disorder.  We will 

continue Seroquel.

5.  DO NOT RESUSCITATE/DO NOT INTUBATE.  This was verified with his son.  He 
had filled out a 

POLST form when he moved into [facility].  It is noted in the primary care provider
's office 

notes.  However, when I try and search their EMR looking for that POLST form, I 
cannot find it.

 We will call on Monday morning and see if they can fax it to me since I am 
unable to search in

their EMR to find it.

6.  Deep vein thrombosis prophylaxis will be CAMPBELL hose.  This patient is already 
bedbound.  He 

is described as mainly being in the bed or wheelchair.

7.  Residual right hemiplegia secondary to stroke.  Again, he is described as a 
bedbound 

patient.  At most, he can be transferred to a wheelchair with standing on one 
leg.  He has been

gradually losing strength but no dysphagia.  No choking episodes.  He does not 
like to work 

with physical therapy and has refused to do so on numerous occasions at [facility] and 

[facility].  I asked his son if it would be worth me ordering physical therapy.  
He does not think

it would help because his dad will just refuse.

 

 

DD: 2018 08:10

TD: 2018 20:56

Job #: [id number]

[email or web address]

## 2018-01-15 LAB
ANION GAP SERPL CALCULATED.4IONS-SCNC: 5 MMOL/L (ref 6–13)
BASOPHILS NFR BLD AUTO: 0 10^3/UL (ref 0–0.1)
BASOPHILS NFR BLD AUTO: 0.4 %
BUN SERPL-MCNC: 15 MG/DL (ref 6–20)
CALCIUM UR-MCNC: 7.9 MG/DL (ref 8.5–10.3)
CHLORIDE SERPL-SCNC: 111 MMOL/L (ref 101–111)
CO2 SERPL-SCNC: 20 MMOL/L (ref 21–32)
CREAT SERPLBLD-SCNC: 0.8 MG/DL (ref 0.6–1.2)
EOSINOPHIL # BLD AUTO: 0.6 10^3/UL (ref 0–0.7)
EOSINOPHIL NFR BLD AUTO: 6.3 %
ERYTHROCYTE [DISTWIDTH] IN BLOOD BY AUTOMATED COUNT: 15.3 % (ref 12–15)
GFRSERPLBLD MDRD-ARVRAT: 92 ML/MIN/{1.73_M2} (ref 89–?)
GLUCOSE SERPL-MCNC: 93 MG/DL (ref 70–100)
HGB UR QL STRIP: 8.9 G/DL (ref 14–18)
LYMPHOCYTES # SPEC AUTO: 2 10^3/UL (ref 1.5–3.5)
LYMPHOCYTES NFR BLD AUTO: 22.7 %
MCH RBC QN AUTO: 30.3 PG (ref 27–31)
MCHC RBC AUTO-ENTMCNC: 33.3 G/DL (ref 32–36)
MCV RBC AUTO: 91.1 FL (ref 80–94)
MONOCYTES # BLD AUTO: 0.7 10^3/UL (ref 0–1)
MONOCYTES NFR BLD AUTO: 7.2 %
NEUTROPHILS # BLD AUTO: 5.7 10^3/UL (ref 1.5–6.6)
NEUTROPHILS # SNV AUTO: 9 X10^3/UL (ref 4.8–10.8)
NEUTROPHILS NFR BLD AUTO: 63.4 %
PDW BLD AUTO: 7.5 FL (ref 7.4–11.4)
PLATELET # BLD: 164 10^3/UL (ref 130–450)
RBC MAR: 2.95 10^6/UL (ref 4.7–6.1)
SODIUM SERPLBLD-SCNC: 136 MMOL/L (ref 135–145)
VANCOMYCIN TROUGH SERPL-MCNC: 24.8 UG/ML (ref 5–15)

## 2018-01-15 RX ADMIN — DIAZEPAM SCH MG: 5 INJECTION, SOLUTION INTRAMUSCULAR; INTRAVENOUS at 14:23

## 2018-01-15 RX ADMIN — SODIUM CHLORIDE SCH: 9 INJECTION, SOLUTION INTRAVENOUS at 21:22

## 2018-01-15 RX ADMIN — SODIUM CHLORIDE, PRESERVATIVE FREE SCH: 5 INJECTION INTRAVENOUS at 06:28

## 2018-01-15 RX ADMIN — ACETAMINOPHEN PRN MG: 325 TABLET ORAL at 00:14

## 2018-01-15 RX ADMIN — VANCOMYCIN HCL-SODIUM CHLORIDE IV SOLN 1.5 GM/250ML-0.9% SCH MLS/HR: 1.5-0.9/25 SOLUTION at 08:21

## 2018-01-15 RX ADMIN — OXYCODONE PRN MG: 5 TABLET ORAL at 02:14

## 2018-01-15 RX ADMIN — NYSTATIN SCH APPLIC: 100000 POWDER TOPICAL at 07:15

## 2018-01-15 RX ADMIN — SODIUM CHLORIDE, PRESERVATIVE FREE SCH: 5 INJECTION INTRAVENOUS at 09:08

## 2018-01-15 RX ADMIN — POLYETHYLENE GLYCOL 3350 SCH GM: 17 POWDER, FOR SOLUTION ORAL at 08:21

## 2018-01-15 RX ADMIN — SODIUM CHLORIDE SCH MLS/HR: 9 INJECTION, SOLUTION INTRAVENOUS at 18:39

## 2018-01-15 RX ADMIN — ACETAMINOPHEN PRN MG: 325 TABLET ORAL at 13:36

## 2018-01-15 RX ADMIN — SODIUM CHLORIDE SCH MLS/HR: 900 INJECTION INTRAVENOUS at 17:08

## 2018-01-15 RX ADMIN — VANCOMYCIN HCL-SODIUM CHLORIDE IV SOLN 1.5 GM/250ML-0.9% SCH: 1.5-0.9/25 SOLUTION at 21:06

## 2018-01-15 RX ADMIN — OXYCODONE PRN MG: 5 TABLET ORAL at 16:40

## 2018-01-15 RX ADMIN — NYSTATIN SCH APPLIC: 100000 POWDER TOPICAL at 20:44

## 2018-01-15 RX ADMIN — SODIUM CHLORIDE SCH MLS/HR: 9 INJECTION, SOLUTION INTRAVENOUS at 06:35

## 2018-01-15 NOTE — PROVIDER PROGRESS NOTE
Subjective





- Prog Note Date


Prog Note Date: 01/15/18


Prog Note Time: 14:42





- Subjective


Subjective: 





he's watching Yaron Johnny on [employer], drinking his cup of coffee being held in 

his left hand. the only thing he wants is to be change into a different 

positions.


To RN he has been alert, cooperative, asks/grunts his request in one word 

bursts. 





wife reports that he hates this life. doesn't want to go back to [facility]. Son is firm in stating there is no other option. They can't live by 

themselves and she can't take care of him. So Back to [facility].





Current Medications





- Current Medications


Current Medications: 





Active Medications





Acetaminophen (Tylenol)  650 mg PO Q4HR PRN


   PRN Reason: Pain 1 to 4


   Last Admin: 01/15/18 00:14 Dose:  650 mg


Sodium Chloride (Normal Saline 0.9%)  1,000 mls @ 100 mls/hr IV .Q10H [facility]


   Last Admin: 01/15/18 06:35 Dose:  100 mls/hr


Azithromycin 500 mg/ Sodium (Chloride)  250 mls @ 250 mls/hr IV Q24H [facility]


   Last Infusion: 01/14/18 20:29 Dose:  Infused


Vancomycin/Sodium Chloride (Vanco/Sod Chloride 0.9%)  1.5 gm in 500 mls @ 325 

mls/hr IV Q12H [facility]


   Last Admin: 01/15/18 08:21 Dose:  325 mls/hr


Ceftriaxone Sodium 2 gm/ (Sodium Chloride)  100 mls @ 200 mls/hr IV Q24H [facility]


   Last Infusion: 01/14/18 18:22 Dose:  Infused


Nystatin (Nystop)  1 applic TOP BID [facility]


   Last Admin: 01/15/18 07:15 Dose:  1 applic


Ondansetron HCl (Zofran Inj)  4 mg IVP Q6HR PRN


   PRN Reason: Nausea / Vomiting


Ondansetron HCl (Zofran Odt)  4 mg TL Q6HR PRN


   PRN Reason: Nausea / Vomiting


Oxycodone HCl (Roxicodone)  5 mg PO Q4HR PRN


   PRN Reason: Pain 5 to 7


   Last Admin: 01/15/18 02:14 Dose:  5 mg


Polyethylene Glycol (Miralax)  17 gm PO DAILY [facility]


   Last Admin: 01/15/18 08:21 Dose:  17 gm


Quetiapine Fumarate (Seroquel)  25 mg PO BID [facility]


   Last Admin: 01/15/18 08:22 Dose:  25 mg


Sodium Chloride (Normal Saline Flush 0.9%)  10 ml IVP PRN PRN


   PRN Reason: AS NEEDED PER PROVIDER ORDERS


   Last Admin: 01/14/18 16:48 Dose:  10 ml


Sodium Chloride (Normal Saline Flush 0.9%)  10 ml IVP Q8HR [facility]


   Last Admin: 01/15/18 06:28 Dose:  Not Given





 





Lisinopril [Zestril] 20 mg PO DAILY 08/14/16 


Polyethylene Glycol 3350 [Miralax] 17 gm PO DAILY 11/01/17 


QUEtiapine [SEROquel] 25 mg PO BID 11/01/17 


Senna [Senokot] 8.6 mg PO BID 12/07/17 


Acetaminophen 650 mg PO TID PRN 01/13/18 


Aspirin [Aspirin EC] 81 mg PO DAILY 01/13/18 


Atorvastatin Calcium 40 mg PO QPM 01/13/18 


HYDROcod/ACETAM 5/325 [Norco 5/325] 1 tab PO TID 01/13/18 


Multivitamin [Theragran] 1 tab PO DAILY 01/13/18 


Nitroglycerin 0.2 mg/Hr Patch [Nitro-Dur] 1 patch TOP DAILY 01/13/18 


Nystatin [Nystop] 1 applic TOP BID 01/13/18 











Objective





- Vital Signs/Intake & Output


Reviewed Vital Signs: Yes


Vital Signs: 


 Vital Signs x48h











  Temp Pulse Resp BP Pulse Ox


 


 01/15/18 08:00   77  20  108/85 H  97


 


 01/15/18 07:00   59 L  14  83/51 L  100


 


 01/15/18 06:00   58 L  14  99/55 L  99


 


 01/15/18 05:00   58 L  15  90/50 L  97


 


 01/15/18 04:00  36.8 C  58 L  15  85/59 L  98


 


 01/15/18 03:00   55 L  15  82/48 L  100


 


 01/15/18 02:00   86  20  92/80  98


 


 01/15/18 01:00   66  15  90/57 L  97











Intake & Output: 


 Intake & Output











 01/12/18 01/13/18 01/14/18 01/15/18





 23:59 23:59 23:59 23:59


 


Intake Total  4126 5695.000 2440


 


Output Total   840 1125


 


Balance  4126 4855.000 1315














- Objective


General Appearance: positive: No acute distress, Alert, Other (again, elderly 

white male looks stated ages, nonverbal except for grunts, watching TV and 

drinking his coffee)


Eyes Bilateral: positive: PERRL, EOMI


ENT: positive: No signs of dehydration


Neck: positive: No JVD.  negative: Lymphadenopathy (R), Lymphadenopathy (L), 

Carotid bruit


Respiratory: positive: Chest non-tender, Wheezes (only when he coughs), Rhonchi 

(tubular breath sounds more from open mouth breathing than actual phlegm).  

negative: Rales


Cardiovascular: positive: Irregularly irregular.  negative: Gallop/S3, Gallop/S4

, Friction rub


Abdomen: positive: Non-tender, No organomegaly, Nml bowel sounds, No distention


Skin: positive: Warm, Dry


Extremities: positive: No pedal edema, Other (both of his legs are not usable. 

thin, thin with wasted muscles mass)


Neurologic/Psychiatric: positive: Oriented x3 (as indicated by answering yes to 

correct dates and placed and people), Motor nml.  negative: CN's nml (2-12) (

aphasia, dysarthria)





- Lab Results


Fish Bones: 


 01/15/18 04:30





 01/15/18 04:30


Other Labs: 


 Lab Results x24hrs











  01/15/18 01/15/18 01/14/18 Range/Units





  04:30 04:30 19:35 


 


WBC   9.0   (4.8-10.8)  x10^3/uL


 


RBC   2.95 L   (4.70-6.10)  10^6/uL


 


Hgb   8.9 L   (14.0-18.0)  g/dL


 


Hct   26.9 L   (42.0-52.0)  %


 


MCV   91.1   (80.0-94.0)  fL


 


MCH   30.3   (27.0-31.0)  pg


 


MCHC   33.3   (32.0-36.0)  g/dL


 


RDW   15.3 H   (12.0-15.0)  %


 


Plt Count   164   (130-450)  10^3/uL


 


MPV   7.5   (7.4-11.4)  fL


 


Neut #   5.7   (1.5-6.6)  10^3/uL


 


Lymph #   2.0   (1.5-3.5)  10^3/uL


 


Mono #   0.7   (0.0-1.0)  10^3/uL


 


Eos #   0.6   (0.0-0.7)  10^3/uL


 


Baso #   0.0   (0.0-0.1)  10^3/uL


 


Absolute Nucleated RBC   0.00   x10^3/uL


 


Nucleated RBC %   0.0   /100WBC


 


Sodium  136    (135-145)  mmol/L


 


Potassium  3.5    (3.5-5.0)  mmol/L


 


Chloride  111    (101-111)  mmol/L


 


Carbon Dioxide  20 L    (21-32)  mmol/L


 


Anion Gap  5.0 L    (6-13)  


 


BUN  15    (6-20)  mg/dL


 


Creatinine  0.8    (0.6-1.2)  mg/dL


 


Estimated GFR (MDRD)  92    (>89)  


 


Glucose  93    ()  mg/dL


 


Calcium  7.9 L    (8.5-10.3)  mg/dL


 


Urine Color    YELLOW  


 


Urine Clarity    CLOUDY  (CLEAR)  


 


Urine pH    6.0  (5.0-7.5)  PH


 


Ur Specific Gravity    1.020  (1.002-1.030)  


 


Urine Protein    NEGATIVE  (NEGATIVE)  mg/dL


 


Urine Glucose (UA)    NEGATIVE  (NEGATIVE)  mg/dL


 


Urine Ketones    NEGATIVE  (NEGATIVE)  mg/dL


 


Urine Occult Blood    SMALL H  (NEGATIVE)  


 


Urine Nitrite    NEGATIVE  (NEGATIVE)  


 


Urine Bilirubin    NEGATIVE  (NEGATIVE)  


 


Urine Urobilinogen    0.2 (NORMAL)  (NORMAL)  E.U./dL


 


Ur Leukocyte Esterase    LARGE H  (NEGATIVE)  


 


Urine RBC    0-5  (0-5)  /HPF


 


Urine WBC    >25 H  (0-3)  /HPF


 


Urine WBC Clumps    PRESENT  


 


Ur Squamous Epith Cells    NONE SEEN  (<= Few)  


 


Urine Bacteria    Rare  (None Seen)  /HPF


 


Ur Microscopic Review    INDICATED  


 


Urine Culture Comments    INDICATED  














Assessment/Plan





- Problem List


(1) Fever


Impression: 


he presented with no antecedent illness other than his wife had a cold this 

last week. He was weaker but really no source on history.  He has a mild cough. 

Exam no contributory other than change in mentation:more angry. Had elevated 

WBC to 20K. CXR was with scant, scant infiltrate. this is in a man who lives in 

[facility] with MRSA on body wounds. So far, he has not had any fever while here. 





Plan:


treat as pneumonia with broad spectrum abx, Day #2 azithromycin since overnight 

pharmacy cancelled, Day #3 Vancomycin, Rocephin


Reveiwed blood and urine cultures and they are without any growth


WBC today is normal.


He will be discharged back to [facility] tomorrow. Transition to po abx with 

amoxil 500 mg po bid for 5 days and azithromycin 250 mg po for 2 more days.


I will not add Bactrim for the healed ulcers on the right sight of his body,


Qualifiers: 


   Fever type: due to other condition   Qualified Code(s): R50.81 - Fever 

presenting with conditions classified elsewhere   





(2) Hypotension


Impression: 


as with above, being treated as part of infection/SIRS but I am not convinced 

it was the scant infiltrate on CXR. 


Bp has improved to 90 systolic from as low at 70's systolic. Baseline is 100-

108 in his PCP notes. So far it's been fluid bolus with NS.


Creat is stable


Mentation improved. 


Today his sytolic is 108 this morning. so baseline now. 109-125 systolic for 

the afternoon.





Plan: 


no pressors for now.


continue to monitor





Qualifiers: 


   Hypotension type: unspecified hypotension type   Qualified Code(s): I95.9 - 

Hypotension, unspecified   





(3) Decubitus skin ulcer


Impression: 


multiple areas of skin breakdown from laying on right side and he's resistant 

to frequent positioning.


so far nurses have gotten him to lay on his back, not on his right. The ulcers 

are almost all healed. 





Plan:


wound care RN when available. so far no visit today.


continue with current RN policy treatment


Qualifiers: 


   Pressure ulcer location: ankle 





(4) Complete immobility due to severe physical disability or frailty


Impression: 


with hemiplegia and hemiparesis follwoing stroke from 1991. 





Plan:


see if he will work with PT. He was asleep yesterday and will check with him 

today.

## 2018-01-16 ENCOUNTER — HOSPITAL ENCOUNTER (OUTPATIENT)
Dept: HOSPITAL 76 - EMS | Age: 83
Discharge: HOME | End: 2018-01-16
Attending: SURGERY
Payer: MEDICARE

## 2018-01-16 VITALS — SYSTOLIC BLOOD PRESSURE: 143 MMHG | DIASTOLIC BLOOD PRESSURE: 57 MMHG

## 2018-01-16 DIAGNOSIS — R52: Primary | ICD-10-CM

## 2018-01-16 DIAGNOSIS — Z74.01: ICD-10-CM

## 2018-01-16 RX ADMIN — SODIUM CHLORIDE, PRESERVATIVE FREE SCH ML: 5 INJECTION INTRAVENOUS at 04:11

## 2018-01-16 RX ADMIN — ACETAMINOPHEN PRN MG: 325 TABLET ORAL at 15:27

## 2018-01-16 RX ADMIN — NYSTATIN SCH: 100000 POWDER TOPICAL at 12:18

## 2018-01-16 RX ADMIN — SODIUM CHLORIDE, PRESERVATIVE FREE SCH: 5 INJECTION INTRAVENOUS at 06:34

## 2018-01-16 RX ADMIN — POLYETHYLENE GLYCOL 3350 SCH GM: 17 POWDER, FOR SOLUTION ORAL at 09:31

## 2018-01-16 RX ADMIN — SODIUM CHLORIDE SCH: 9 INJECTION, SOLUTION INTRAVENOUS at 06:33

## 2018-01-16 RX ADMIN — SODIUM CHLORIDE, PRESERVATIVE FREE PRN ML: 5 INJECTION INTRAVENOUS at 09:30

## 2018-01-16 NOTE — DISCHARGE SUMMARY
Discharge Summary


Admit Date: 01/13/18


Discharge Date: 01/16/18


Discharging Provider: Sarah Mahoney DO


Primary Care Provider: Breezy Kramer


Code Status: Attempt Resuscitation


Condition at Discharge: Stable


Discharge Disposition: 03 [facility] DC/Xfer





- DIAGNOSES


Admission Diagnoses: 





1.  Fever


2.  Hypotension


3.  Decubitus ulcers


4.  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


5. Depression with adjustment disorder


Discharge Diagnoses with Status of Each Condition: 





1.  Fever- Resolved.This was accompanied by an elevated white blood cell count 

and presumed to be sepsis secondary to infected skin wounds.The WBC count has 

normalized and the patient has not had any fevers for over 24 hours.


2.  Hypotension- Improved, resolved.


3.  Decubitus ulcers- healing, no signs of active infection at this time.  WBC 

count back WNL.


4.  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





- HPI


History of Present Illness: 





Mr Merrick Zelaya is an 86-year-old male with a history of left-sided CVA in 1991 

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  He has a history of lying on the 

right side and has developed multiple decubitus ulcers.  The day of his 

admission he developed fevers and the [facility] where he lives sent 

him over to the hospital for further evaluation and treatment.He was found to 

have an elevated white blood cell count and was admitted and given IV 

antibiotics.





- HOSPITAL COURSE


Hospital Course: 





Mr Merrick Zelaya is an 86-year-old male with a history of left-sided CVA in 1991 

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  He has a history of lying on the 

right side and has developed multiple decubitus ulcers.  The day of his 

admission he developed fevers and the [facility] where he lives sent 

him over to the hospital for further evaluation and treatment.He was found to 

have an elevated white blood cell count and was admitted and given IV 

antibiotics.At this time his wounds are healing and show no signs of active 

infection and his white blood cell count has returned to baseline.  He will 

therefore be discharged back to the [facility].





- ALLERGIES


Allergies/Adverse Reactions: 


 Allergies











Allergy/AdvReac Type Severity Reaction Status Date / Time


 


No Known Drug Allergies Allergy   Verified 01/13/18 14:42














- MEDICATIONS


Home Medications: 


 Ambulatory Orders











 Medication  Instructions  Recorded  Confirmed


 


Lisinopril [Zestril] 20 mg PO DAILY 08/14/16 01/13/18


 


Polyethylene Glycol 3350 [Miralax] 17 gm PO DAILY 11/01/17 01/13/18


 


QUEtiapine [SEROquel] 25 mg PO BID 11/01/17 01/13/18


 


Senna [Senokot] 8.6 mg PO BID 12/07/17 01/13/18


 


Acetaminophen 650 mg PO TID PRN 01/13/18 01/13/18


 


Aspirin [Aspirin EC] 81 mg PO DAILY 01/13/18 01/13/18


 


Atorvastatin Calcium 40 mg PO QPM 01/13/18 01/13/18


 


HYDROcod/ACETAM 5/325 [Norco 5/325] 1 tab PO TID 01/13/18 01/13/18


 


Multivitamin [Theragran] 1 tab PO DAILY 01/13/18 01/13/18


 


Nitroglycerin 0.2 mg/Hr Patch 1 patch TOP DAILY 01/13/18 01/13/18





[Nitro-Dur]   


 


Nystatin [Nystop] 1 applic TOP BID 01/13/18 01/13/18


 


Acetaminophen [Tylenol] 650 mg PO Q4HR PRN  tablet 01/16/18 


 


Ondansetron Odt [Zofran Odt] 4 mg TL Q6HR PRN  tablet 01/16/18 


 


Polyethylene Glycol 3350 [Miralax] 17 gm PO DAILY  packet 01/16/18 


 


oxyCODONE [Roxicodone] 5 mg PO Q4HR PRN  tablet 01/16/18 














- PHYSICAL EXAM AT DISCHARGE


General Appearance: positive: No acute distress, Alert


Eyes Bilateral: positive: Normal inspection, PERRL, EOMI


ENT: positive: ENT inspection nml, Pharynx nml, No signs of dehydration


Neck: positive: Nml inspection, Thyroid nml, No JVD, Trachea midline.  negative

: Thyromegaly


Respiratory: positive: Chest non-tender, No respiratory distress, Breath sounds 

nml.  negative: Wheezes, Rales, Rhonchi


Cardiovascular: positive: Regular rate & rhythm, No murmur, No gallop


Peripheral Pulses: positive: 1+


Abdomen: positive: Non-tender, No organomegaly, Nml bowel sounds, No 

distention.  negative: Guarding, Rebound, Hepatomegaly, Splenomegaly, Mass


Back: positive: Nml inspection.  negative: CVA tenderness (R), CVA tenderness (L

)


Skin: positive: Color nml, No rash, Warm, Dry.  negative: Cyanosis


Extremities: positive: Non-tender, No pedal edema, Other (Right sided 

hemiparesis)


Neurologic/Psychiatric: positive: Oriented x3, Weakness, Facial droop





- LABS


Result Diagrams: 


 01/15/18 04:30





 01/15/18 04:30





- DIAGNOSTIC IMAGING


Diagnostic Imaging Results: Final report reviewed


Diagnostic Imaging Results Comments: 





EXAM: 


CHEST RADIOGRAPHY 





EXAM DATE: 1/13/2018 03:03 PM. 





CLINICAL HISTORY: Fever and low blood pressure. 





COMPARISON: 11/11/2017. 





TECHNIQUE: 1 view. 





FINDINGS: 


Lungs/Pleura: Patient rotation to the right. New minimal left basilar 

atelectasis or possible 


infiltrate. No dense consolidation. No pleural effusion or pneumothorax. 





Mediastinum: Normal heart size. Thoracic aortic ectasia and calcifications, 

without change. 





Other: None. 





IMPRESSION: 


1. New very mild left basilar atelectasis or infiltrate. 


2. Thoracic aortic ectasia, without change. 











- FOLLOW UP


Follow Up: 





Follow-up with the memory care unit physician





- TIME SPENT


Time Spent in Discharge (Minutes): 30

## 2018-01-16 NOTE — DISCHARGE PLAN
Discharge Plan for SNF / [facility]





- DC Plan and Transition Orders


Disposition: 03 SNF DC/Xfer


Condition: Stable


SNF Transition Orders: 





Admit to: [facility] under the care of Breezy Kramer MD





Discharge Diagnosis:  


fever, subjective. Patient had no fever during his stay


hypotension from dehydration


decubitus skin ulcers of right body, various stages of healing


residual of stroke with right body hemiplegia, aphasia, dysarthria


depression with anger


HTN


Hyperlipidemia


spinal stenosis


OA w hx of right hip replacement


hx of tobacco abuse, 4 ppd





Medicare Certification: I do not certify that Post Hospital skilled nursing 

care is medically necessary on a continuing basis for any of the conditions for 

which she/he is receiving care during hospitalization.





 Notify PCP of admission and forward orders to primary provider for signature.





Weight on admission and monthly.  Call PCP immediately if weight increases by 8

    pounds or if patient develops dyspnea, chest pain/tightness or edema.





House Bowel Program: yes


If no BM after 2 days, nurse may give M.O.M. 30ml PO PRN and /or ducolax Supp 1 

VT and /or LAUREEN 250mg P.O., and/or senna 1-2 tabs PO.  On day 3 nurse may give 

repeat above order until residents constipation is resolved. 





Immunizations:





Annual Influenza Vaccine: yes.


(between Sept 1st and March 31st.) Unless allergy or already given





Two-Step PPD: yes


per [facility] [phone] or appropriate documentation of approved exceptions


   


Treatments & Other Orders: keep wounds on body clean and dry. all are healing 

with eschar and no acute infection   





Oxygen Orders: none      





Lab Tests or X-Rays Orders: none





Orthopedic Orders: none.








Medications:





PLEASE REFER TO THE DISCHARGE MEDICATION LIST.








Insulin Orders? no





Diagnosis:  No Diabetes


Initiate hypo and hyperglycemia protocols for BG <70 and BG >375.  May check BG 

prn for signs/symptoms of dysglycemia.





Frequency of BG checks: [AC/Meal/HS]





Basal Insulin:


   


   [] Lantus  100 units / ml inject subq as follows: []


   [] Other: []





Correction Insulin: -  Select the type of insulin below





   [Choose: Novolog/Humalog]100 units /ml insulin inject subq per orders 

indicate below














[] LOW DOSE


  [] MODERATE DOSE


  [] MODERATE/HIGH     


       DOSE [] HIGH DOSE


 


 


    GB               UNITS       GB               UNITS         GB             

  UNITS    GB               UNITS


 


 


         0 UNITS          0 UNITS          0 UNITS     

     0 UNITS


 


 


141-175       1 UNITS 141-175       1 UNITS 141-175       2 UNITS 141-175       

3 UNITS


 


 


176-225       2 UNITS 176-225       3 UNITS 176-225       4 UNITS 176-225       

5 UNITS


 


 


226-275       3 UNITS 226-275       5 UNITS 226-275       6 UNITS 226-275       

7 UNITS


 


 


276-325       4 UNITS 276-325       7 UNITS 276-325       8 UNITS 276-325       

9 UNITS


 


 


326-375       5 UNITS 326-375       9 UNITS 326-375      10 UNITS 326-375      

11 UNITS


 


 


>375    CONTACT MD >375    CONTACT MD >375    CONTACT MD >375    CONTACT MD


 











Custom Dosing: 





   [Choose: None/Novolog/Humalog] 100 units/ml Insulin inject subq as follows:











   GB    Units


 


 [] Units


 


141-175 [] Units


 


176-225 [] Units


 


226-275 [] Units


 


276-325 []Units


 


326-375 [] Units


 


>375 Contact MD














Allergies and Adverse Reactions: 


 Allergies











Allergy/AdvReac Type Severity Reaction Status Date / Time


 


No Known Drug Allergies Allergy   Verified 01/13/18 14:42














- Medications


New Prescriptions: 


Amoxicillin 500 mg PO TID #15 capsule


Azithromycin 250 mg PO DAILY #4 tablet


HYDROcod/ACETAM 5/325 [Norco 5/325] 1 tab PO TID #30 tablet





- Diet


Type: Geriatric


Texture: Regular


Liquids: Thin


May have monthly special meal: Yes





- Therapies | Activity


Rehabilitation Potential: Maximize functional status


Activity: Activity as Tolerated


Assistance Devices: Wheelchair, Walker

## 2018-01-16 NOTE — DISCHARGE PLAN
Discharge Plan


Disposition: 03 SNF DC/Xfer


Condition: Stable


Diet: Regular


Activity Restrictions: Activity as Tolerated


Shower Restrictions: No


Driving Restrictions: No


Assistance Devices: Wheelchair


Weight Bearing: Partial Weight (Patient will be discharged back to the memory 

care unit in improved condition. He will resume his prior home medications. He 

will continue on oral antibiotics for 5 more days.)


No Smoking: If you smoke, Please STOP!  Call [phone] for help.


Follow-up with: 


Brezey Kramer DO [Primary Care Provider] -

## 2018-01-18 ENCOUNTER — HOSPITAL ENCOUNTER (OUTPATIENT)
Dept: HOSPITAL 76 - PC | Age: 83
Discharge: HOME | End: 2018-01-18
Attending: NURSE PRACTITIONER
Payer: MEDICARE

## 2018-01-18 DIAGNOSIS — I69.351: ICD-10-CM

## 2018-01-18 DIAGNOSIS — Z66: ICD-10-CM

## 2018-01-18 DIAGNOSIS — Z79.82: ICD-10-CM

## 2018-01-18 DIAGNOSIS — A49.02: ICD-10-CM

## 2018-01-18 DIAGNOSIS — L89.619: ICD-10-CM

## 2018-01-18 DIAGNOSIS — F03.91: ICD-10-CM

## 2018-01-18 DIAGNOSIS — L89.519: ICD-10-CM

## 2018-01-18 DIAGNOSIS — R06.2: ICD-10-CM

## 2018-01-18 DIAGNOSIS — I69.320: ICD-10-CM

## 2018-01-18 DIAGNOSIS — L89.219: ICD-10-CM

## 2018-01-18 DIAGNOSIS — Z51.5: Primary | ICD-10-CM

## 2018-01-18 DIAGNOSIS — Z79.891: ICD-10-CM

## 2018-01-18 NOTE — CONSULTATION NOTE
Palliative Care Follow Up





- Referral


Referring Provider: Dr. Breezy Kramer


Time of Visit: 230-245


Referral setting: Assisted living


Referral Reason: Wheezing/pneumonia





- Information Sources


Records reviewed: Previous records reviewed


History/Review of Systems obtained from: Caregiver (clinical staff)


Exam limitations: Clinical condition (Patient aphasic and unable to communicate

, is easily agitated has right sided hemiplegia as a result of catastrophic 

stroke in 91)





- History of Present Illness Update


Brief HPI Update: 


Patient recently hospitalized on 1/13 for fevers, and came in from the 

facility.  He did have hypotension, as well as a infiltrate in left lower lobe, 

and elevated white count of 20.3.  He was treated in the context he also had a 

MRSA positive wound on 1/11 in the right foot.  He had been on Augmentin, of 

note his bursa nasal swab was negative.  He was discharged on azithromycin and 

amoxicillin, and resume by home health care who saw him today.  I was contacted 

in the context of patient with significant moist cough, wheezing, though is not 

hypoxic.  I do find him with a low-grade temp so of 99.3 and difficulty opening 

his secretions.





Patient does have long-standing pressure ulcers as a result of his 

unwillingness to lay anywhere except on his right side.  Home health has been 

working on this since November, with slow improvement.  He does have 2 wounds 

on his right hip, these are filling in, as well as a right forearm skin tear, 

one on his right ankle and heel.  All of these have been progressing as far as 

healing.





Wife is in the room, she was really quite distraught, unable to really engage 

in the conversation.  She had thought his wounds were completely healed, no 

longer had MRSA,  because they had no dressings on them at the facility, home 

health had restarted their wound care regimen, she was not present for that 

visit.  She also had multiple complaints about the room not being clean, the 

charges, and was overall not engaged in the conversation or tracking 

information given.








Social History





- Living Situation


Living arrangement: Assisted living (Patient is a resident at [facility] 

dementia unit, it appears this is been a difficult transition for them both.)





Medications/Allergies





- Medications


Home Medications: 


 Ambulatory Orders











 Medication  Instructions  Recorded  Confirmed


 


Lisinopril [Zestril] 20 mg PO DAILY 08/14/16 01/20/18


 


Polyethylene Glycol 3350 [Miralax] 17 gm PO DAILY 11/01/17 01/20/18


 


QUEtiapine [SEROquel] 25 mg PO BID 11/01/17 01/20/18


 


Senna [Senokot] 8.6 mg PO DAILY 12/07/17 01/20/18


 


Aspirin [Aspirin EC] 81 mg PO DAILY 01/13/18 01/20/18


 


Atorvastatin Calcium 40 mg PO DAILY 01/13/18 01/20/18


 


Multivitamin [Theragran] 1 tab PO DAILY 01/13/18 01/20/18


 


Nystatin [Nystop] 1 applic TOP BID 01/13/18 01/20/18


 


Azithromycin 250 mg PO DAILY #4 tablet 01/16/18 01/20/18


 


HYDROcod/ACETAM 5/325 [Norco 5/325] 1 tab PO TID #30 tablet 01/16/18 01/20/18


 


Acetaminophen [Tylenol] 500 mg PO Q4HR PRN 01/20/18 01/20/18


 


Amoxicillin 500 mg PO TID 01/20/18 01/20/18


 


HYDROcod/ACETAM 5/325 [Norco 5/325] 1 tab PO Q4HR PRN MDD nte 3000 mg 01/20/18 01/20/18





 apap  


 


Ipratropium/Albuterol [Duoneb] 1 vial INH TID 01/20/18 01/20/18


 


Saccharomyces Boulardii [Florastor] 250 mg PO BID 01/20/18 01/20/18


 


guaiFENesin [Guaifenesin] 20 ml PO Q4HR PRN 01/20/18 01/20/18














- Allergies


Allergies/Adverse Reactions: 


 Allergies











Allergy/AdvReac Type Severity Reaction Status Date / Time


 


No Known Drug Allergies Allergy   Verified 01/13/18 14:42














Review of Systems





- Respiratory


Respiratory: reports: Cough, Sputum production (loose swallowing secretions), 

Wheezing, SOB with exertion





- Genitourinary


Genitourinary: reports: Incontinence





- Musculoskeletal


Musculoskeletal: reports: Limited range of motion (right hemiparesis), Other (

does get up for meals in wheelchair; wife wanting recliner in room)





- Integumentary


Integumentary: reports: Other (followed by  for wounds since November 2017)





- Neurological


Neurological: reports: Memory problems, Other (aphasic)





- Psychiatric


Psychiatric: reports: Aggitation, Behavior disturbances





- Endocrine


Endocrine: reports: Intolerance to cold (room quite warm)





- Hematologic/Lymphatic


Hematologic/Lymphatic: reports: Anemia (1/15 8.9 hg/hct 26.9; wbc 9.0)





- Other Findings


Other Findings: 


limited ROS with patient's dementia








Physical Exam





- Vital Signs


Temperature: 99.3 C


Pulse Rate: 88


Respiratory Rate: 20


O2 Saturation: 95 (ra @ rest)


Blood Pressure: 112/66





- Physical Exam


General Appearance: positive: Moderate distress, Other (agitation; cough and 

wheezing)


Eyes Bilateral: positive: No scleral icterus


ENT: positive: No signs of dehydration


Neck: positive: Trachea midline


Cardiovascular: positive: Regular rate & rhythm


Respiratory: positive: Wheezes (throughout; loose cough unable to bring up), 

Rhonchi


Abdomen: positive: Soft


Skin: positive: Pallor, Pressure wound (dressings from ; examined pictures no 

s/s of increased redness or infection)


Extremities: positive: No pedal edema


Neurologic/Psychiatric: positive: Unintelligible speech, Other (agitated; 

cooperated with exam when wife directed)





Palliative Care





- POLST


Patient has POLST: Yes


POLST Status: DNR, Selective Treatment


Pain: Comment (unknown)





- Palliative Care


Discussion: 


Wife distressed as noted earlier, did not engage in any long-term planning our 

conversation regarding hospitalization.  Will need follow-up as far as goals of 

care and wishes for further intervention particularly in the face of his 

current ongoing decline








Results





- Lab Results


Lab results reviewed: Yes


Lab and Imaging Results: 


1/11 right foot culture positive for MRSA; may redo after finished with AB 

after one week; precautions problematic and difficult for facility if does not 

need








Impression and Recommendations





- Palliative Care


Impression: 


This is an 86-year-old gentleman who was recently hospitalized for fever, was 

treated with antibiotics with a presumed most likely pneumonia with the white 

count presenting at 20.3, are possible related to his wounds.  He was returned 

to the facility on 1/16.  Findings today by home health care for resumption of 

care, found him with significant cough, wheezing, and difficulty with 

secretions.  He does have a low-grade temp of 99.3, though the room is quite 

hot.  He is not hypoxic his O2 sats are 95%.  He is currently on antibiotic 

therapy.





Recommendations/Counseling Done: 


1.  Wheezing, most likely attributed to exacerbation of either viral or 

underlying presumed pneumonia.  Patient currently on antibiotics, will initiate 

duo nebs 3 times a day to help loosen secretions.  Did encourage to have 

patient up as much as he can tolerate in his wheelchair also looked at getting 

recliner in room per wife's request with Merry BALDERAS.


2.  Decub's stage III.  Does have multiple decubitus do appear to be healing 

from examination of home health care pictures.  They have resumed wound care, 

wife has been under the impression that they were all completely healed, though 

documentation does show they were in various stages of healing.  Try to explain 

that home health and resumed care and given the environment they will continue 

with dressings until completely healed.  MRSA precautions need to continue 

until right foot culture negative after 1 week finishing antibiotics.  It is 

burdensome to have MRSA precautions in this facility, will write order for home 

health to obtain.


3.  Goals of care.  Wife quite distraught over multiple complaints regarding 

facility, is not willing at this point in time to engage in any conversation.  

Will defer this to Monday visit with regular palliative care APN





Time Spent: 


15 minutes with greater than 50% of this done and coordination of care for 

clinical staff, obtaining nebulizer, follow-up from  home health

## 2018-01-22 ENCOUNTER — HOSPITAL ENCOUNTER (OUTPATIENT)
Dept: HOSPITAL 76 - PC | Age: 83
Discharge: HOME | End: 2018-01-22
Attending: NURSE PRACTITIONER
Payer: MEDICARE

## 2018-01-22 DIAGNOSIS — Z51.5: Primary | ICD-10-CM

## 2018-01-22 DIAGNOSIS — Z74.01: ICD-10-CM

## 2018-01-22 DIAGNOSIS — Z79.82: ICD-10-CM

## 2018-01-22 DIAGNOSIS — Z66: ICD-10-CM

## 2018-01-22 DIAGNOSIS — R45.1: ICD-10-CM

## 2018-01-22 DIAGNOSIS — L89.619: ICD-10-CM

## 2018-01-22 DIAGNOSIS — L89.519: ICD-10-CM

## 2018-01-22 DIAGNOSIS — A49.02: ICD-10-CM

## 2018-01-22 DIAGNOSIS — Z79.51: ICD-10-CM

## 2018-01-22 DIAGNOSIS — F91.9: ICD-10-CM

## 2018-01-22 DIAGNOSIS — L89.899: ICD-10-CM

## 2018-01-22 DIAGNOSIS — I69.320: ICD-10-CM

## 2018-01-22 DIAGNOSIS — I69.351: ICD-10-CM

## 2018-01-22 DIAGNOSIS — Z79.891: ICD-10-CM

## 2018-01-22 DIAGNOSIS — L89.219: ICD-10-CM

## 2018-01-22 NOTE — CONSULTATION NOTE
Palliative Care Follow Up





- Referral


Referring Provider: Dr Kramer


Time of Visit: 1/22/18   15:30 - 15:55


Referral setting: Assisted living (Seen in home setting due to taxing and 

considerable effort required to leave the home setting due to R side hemiplegia 

s/p distant CVA and being bed-bound. I also requied access to patient's records 

in facility.)





- Information Sources


Records reviewed: RN notes reviewed, Previous records reviewed


History/Review of Systems obtained from: Patient, Family, Nursing


Exam limitations: Clinical condition (Aphasia, has extreme difficulty 

communicating, easily agitated.)





- History of Present Illness Update


Brief HPI Update: 





This is an 86-year-old man with R side hemiparesis and severe aphasia following 

a CVA in 1991. 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 

forearm from lying excessively on R side; his wound care is being managed by 

Home Health nursing.





He was recently hospitalized on 1/13/18 for fever, with hypotension, infiltrate 

of L lower lobe, and elevated white count of 20.3. He had a positive MRSA test 

on 1/11/18 for the R foot and was treated in the context of MRSA.  He was 

discharged back to the facility from the hospital on azithromycin and 

amoxicillin, and Home Health for wound care was resumed.





On 1/19 he was started on DuoNeb nebulizer treatments routinely three times 

daily to help loosen the secretions and help the wheezing. Azithromycin regimen 

was completed on 1/19 and amoxicillin completed 1/21.  





Today he is awake and alert. He has just completed the antibiotic regimens. He 

has audible wheezing and reports that the Duonebs helps. I spoke with his son 

prior to my visit, who had seen him 4 days ago and was working today, so not 

able to come today.





He is pleased with having the recliner in his room, he also indicates that the 

nebulizer help his breathing. He does not complain of SOB.  He is now in a 

private room and the facility plans to have him remain in the private room.








Social History





- Living Situation


Living arrangement: Assisted living ([facility])


Living Situation: With caregiver(s), Other


Support System: 





Spouse lives across the street. Son lives in the area.








Medications/Allergies





- Medications


Home Medications: 


 Ambulatory Orders











 Medication  Instructions  Recorded  Confirmed


 


Lisinopril [Zestril] 20 mg PO DAILY 08/14/16 01/22/18


 


Polyethylene Glycol 3350 [Miralax] 17 gm PO DAILY 11/01/17 01/20/18


 


QUEtiapine [SEROquel] 25 mg PO BID 11/01/17 01/20/18


 


Senna [Senokot] 8.6 mg PO DAILY 12/07/17 01/20/18


 


Aspirin [Aspirin EC] 81 mg PO DAILY 01/13/18 01/22/18


 


Atorvastatin Calcium 40 mg PO DAILY 01/13/18 01/22/18


 


Multivitamin [Theragran] 1 tab PO DAILY 01/13/18 01/22/18


 


Nystatin [Nystop] 1 applic TOP BID 01/13/18 01/20/18


 


HYDROcod/ACETAM 5/325 [Norco 5/325] 1 tab PO TID #30 tablet 01/16/18 01/22/18


 


Acetaminophen [Tylenol] 500 mg PO Q4HR PRN 01/20/18 01/22/18


 


HYDROcod/ACETAM 5/325 [Norco 5/325] 1 tab PO Q4HR PRN MDD nte 3000 mg 01/20/18 01/22/18





 apap  


 


Ipratropium/Albuterol [Duoneb] 1 vial INH TID 01/20/18 01/22/18


 


Saccharomyces Boulardii [Florastor] 250 mg PO BID 01/20/18 01/20/18


 


guaiFENesin [Guaifenesin] 20 ml PO Q4HR PRN 01/20/18 01/20/18














- Allergies


Allergies/Adverse Reactions: 


 Allergies











Allergy/AdvReac Type Severity Reaction Status Date / Time


 


No Known Drug Allergies Allergy   Verified 01/13/18 14:42














Review of Systems





- Constitutional


Constitutional: reports: Weakness (R side hemiplegia), Weight stable (217 lbs 

on 1/7/18.  214.8 lbs on 11/1/17.)





- Respiratory


Respiratory: reports: Wheezing





- Musculoskeletal


Musculoskeletal: reports: Stiffness (R side), Limited range of motion (R side 

hemiparesis), Assistive devices (wheelchair; now has recliner in room)





- Integumentary


Integumentary: reports: Other (R side wounds (hip, heel), being managed by Home 

Health nursing)





- Neurological


Neurological: reports: Memory problems, Other (aphasic)





- Psychiatric


Psychiatric: reports: Aggitation, Behavior disturbances





- Hematologic/Lymphatic


Hematologic/Lymphatic: reports: Anemia





Physical Exam





- Vital Signs


Temperature: 97.6 F


Pulse Rate: 62


O2 Saturation: 95


Blood Pressure: 103/60





- Physical Exam


General Appearance: positive: Alert, Mild distress (because nursing was 

changing his briefs), Moderate distress


Eyes Bilateral: positive: No lid inflammation, Conjunctivae nml, No scleral 

icterus


ENT: positive: Pharynx nml, No signs of dehydration, Pharyngeal erythema.  

negative: Dry mucous membranes


Neck: positive: Thyroid nml, No JVD, Trachea midline


Cardiovascular: positive: Regular rate & rhythm, No murmur


Respiratory: positive: Wheezes (audible).  negative: Diminished throughout, 

Rhonchi


Abdomen: positive: Soft


Skin: positive: Pressure wound (dressings clean, dry, intact)


Extremities: positive: No pedal edema


Neurologic/Psychiatric: positive: Mood/affect nml





Palliative Care





- POLST


Patient has POLST: Yes


POLST Status: DNR, Selective Treatment


Pain: Comment (No complaint of pain)


Anxiety: Comment (Today he was calm and cooperative during my visit and exam)


Dyspnea: Comment (No complaint of dyspnea)





- Palliative Care


Discussion: 





Need to follow up with wife on goals of care and wishes for further 

interventions in light of ongoing decline.








Impression and Recommendations





- Palliative Care


Impression: 





This is an 86-year-old man recently hospitalized for fever, presumed pneumonia, 

and treated with two different antibiotics, recently completed. He was started 

on Duonebs 1/19/18 for cough and wheezing and has shown some improvement but 

still has audible wheezing. Hiis temperature is normal, and O2 sats are 95%. 

Home Health is continuing wound care for his chronic R side pressure wounds. 





Recommendations/Counseling Done: 





Wheezing:  Improved, still audible. Continue Duoneb treatments 3x/daily. He now 

has recliner in room and sits up to help clear secretions.





Debuitus stage II ulcers: Multiple decubitus, Home Health wound care is 

managing.  Antibiotics completed yesterday. Must wait one week from end of 

antibiotics before taking the first culture. If that culture is negative, wait 

a second week and take another culture. If the second culture is negative, the 

patient can go off precautions. Until then, he should remain on precautions.





Goals of care: Still to be discussed with wife regarding that and the family's 

wishes for further interventions in view of patient's declining status.





Time Spent: 





25 minutes were spent with more than 50% of the time spent on counseling and 

coordination of care.

## 2018-01-29 ENCOUNTER — HOSPITAL ENCOUNTER (OUTPATIENT)
Dept: HOSPITAL 76 - LAB.N | Age: 83
Discharge: HOME | End: 2018-01-29
Attending: FAMILY MEDICINE
Payer: MEDICARE

## 2018-01-29 DIAGNOSIS — L89.610: Primary | ICD-10-CM

## 2018-01-29 PROCEDURE — 87070 CULTURE OTHR SPECIMN AEROBIC: CPT

## 2018-01-29 PROCEDURE — 87205 SMEAR GRAM STAIN: CPT

## 2018-01-31 ENCOUNTER — HOSPITAL ENCOUNTER (OUTPATIENT)
Dept: HOSPITAL 76 - PC | Age: 83
Discharge: HOME | End: 2018-01-31
Attending: NURSE PRACTITIONER
Payer: MEDICARE

## 2018-01-31 DIAGNOSIS — Z66: ICD-10-CM

## 2018-01-31 DIAGNOSIS — Z51.5: Primary | ICD-10-CM

## 2018-01-31 DIAGNOSIS — R45.1: ICD-10-CM

## 2018-01-31 DIAGNOSIS — I69.320: ICD-10-CM

## 2018-01-31 DIAGNOSIS — L89.619: ICD-10-CM

## 2018-01-31 DIAGNOSIS — Z79.891: ICD-10-CM

## 2018-01-31 DIAGNOSIS — T40.2X5D: ICD-10-CM

## 2018-01-31 DIAGNOSIS — L89.219: ICD-10-CM

## 2018-01-31 DIAGNOSIS — I69.351: ICD-10-CM

## 2018-01-31 DIAGNOSIS — M62.81: ICD-10-CM

## 2018-01-31 DIAGNOSIS — Z79.82: ICD-10-CM

## 2018-01-31 DIAGNOSIS — R06.81: ICD-10-CM

## 2018-01-31 DIAGNOSIS — F32.9: ICD-10-CM

## 2018-01-31 DIAGNOSIS — K59.03: ICD-10-CM

## 2018-01-31 DIAGNOSIS — L89.899: ICD-10-CM

## 2018-01-31 DIAGNOSIS — L89.519: ICD-10-CM

## 2018-01-31 DIAGNOSIS — A49.02: ICD-10-CM

## 2018-01-31 NOTE — CONSULTATION NOTE
Palliative Care Follow Up





- Referral


Referring Provider: Dr Breezy Kramer


Time of Visit: 1/31/2018  14:10 - 14:55


Referral setting: Assisted living





- Information Sources


Records reviewed: RN notes reviewed, Previous records reviewed


History/Review of Systems obtained from: Patient, Nursing


Exam limitations: Clinical condition (expressive aphasia, confusion)





- History of Present Illness Update


Brief HPI Update: 





This is an 86-year-old man with R side hemiparesis and severe aphasia following 

a CVA in 1991. He has chronic pressure injuries on R hip, R ankle, R heel, R 

forearm from lying on R side. He is currently on room precautions for MRSA 

infection of heel. Wound care is managed by Home Health nursing.





He was hospitalized 1/13/18 for fever accompanied by elevated white blood cell 

count and presumed to be sepsis secondary to infected pressure injuries. Chest 

xray showed scant left infiltrates. Fever and WBC resolved and he was 

discharged 1/16/18 on azithromycin and amoxicillin, which completed on 1/19 and 

1/21 respectively. He was started on Duonebs nebulizer treatments on 1/19/18 to 

loosen secretions and help with wheezing, and continues on the nebulizer 

treatments to the present.





Nursing has noted a general decline; they have reported pulse oximetry as low 

as 88%, episodes of apnea of one minute (son does report he has longstanding 

sleep apnea), wheezing continues, he sleeps more often, does not want to sit up 

in his recliner which originally he was very enthused and pleased with, and 

refuses his regular activities, which is not normal for him. Nursing reports as 

soon as the nebulizer treatment is completed he requests more.





Upon assessment today, he is more withdrawn, he no longer booms out "bull***", 

seems more confused and responds to questions with a plaintive "I don't know," 

which is not typical of him. 








Social History





- Living Situation


Living arrangement: Assisted living ([facility] dementia unit)


Living Situation: With caregiver(s)


Support System: 





Spouse lives across the street in assisted living and his son lives locally and 

is involved in his care.








Medications/Allergies





- Medications


Home Medications: 


 Ambulatory Orders











 Medication  Instructions  Recorded  Confirmed


 


Lisinopril [Zestril] 20 mg PO DAILY 08/14/16 01/22/18


 


Polyethylene Glycol 3350 [Miralax] 17 gm PO DAILY 11/01/17 01/20/18


 


QUEtiapine [SEROquel] 25 mg PO BID 11/01/17 01/20/18


 


Senna [Senokot] 8.6 mg PO DAILY 12/07/17 01/20/18


 


Aspirin [Aspirin EC] 81 mg PO DAILY 01/13/18 01/22/18


 


Atorvastatin Calcium 40 mg PO DAILY 01/13/18 01/22/18


 


Multivitamin [Theragran] 1 tab PO DAILY 01/13/18 01/22/18


 


Nystatin [Nystop] 1 applic TOP BID 01/13/18 01/20/18


 


HYDROcod/ACETAM 5/325 [Norco 5/325] 1 tab PO TID #30 tablet 01/16/18 01/22/18


 


Acetaminophen [Tylenol] 500 mg PO Q4HR PRN 01/20/18 01/22/18


 


HYDROcod/ACETAM 5/325 [Norco 5/325] 1 tab PO Q4HR PRN MDD nte 3000 mg 01/20/18 01/22/18





 apap  


 


Ipratropium/Albuterol [Duoneb] 1 vial INH QID 01/20/18 01/22/18


 


Saccharomyces Boulardii [Florastor] 250 mg PO BID 01/20/18 01/20/18


 


guaiFENesin [Guaifenesin] 20 ml PO Q4HR PRN 01/20/18 01/20/18


 


Morphine Sulfate [Morphine Sulf 0.25 ml PO Q3H PRN 01/31/18 01/31/18





Oral (Roxanol)]   














- Allergies


Allergies/Adverse Reactions: 


 Allergies











Allergy/AdvReac Type Severity Reaction Status Date / Time


 


No Known Drug Allergies Allergy   Verified 01/13/18 14:42














Review of Systems





- Constitutional


Constitutional: reports: Fatigue, Weakness (R side hemiparesis), Weight stable (

217 lbs on 1/7/18.  211.6 lbs on 12/1/17.  217.8 lbs on 11/24/17.  Ordered 

weekly weights or arm circumference)





- Cardiovascular


Cardiovascular: denies: Edema





- Respiratory


Respiratory: reports: Wheezing, Apnea (reported by nursing during daytime naps, 

up to 1 minute. Son reports longstanding sleep apnea)





- Gastrointestinal


Gastrointestinal: reports: Constipation (Irregular BMs, sometimes no BMs for 3 

days. Most recent: 1/22, 1/26, 1/27, 1/30.), Diarrhea (Reports of loose/watery 

stools)





- Genitourinary


Genitourinary: reports: Incontinence





- Musculoskeletal


Musculoskeletal: reports: Muscle aches (R side), Stiffness (R side), Limited 

range of motion (R side hemiparesis), Muscle weakness, Assistive devices (

wheelchair; recliner in room), Transfer issues





- Integumentary


Integumentary: reports: Other (Pressure injuries on R side. MRSA wound R heel)





- Neurological


Neurological: reports: Memory problems, Other (aphasic)





- Psychiatric


Psychiatric: reports: Aggitation, Other (depression with anger)





- Hematologic/Lymphatic


Hematologic/Lymphatic: reports: Recurrent infections (MRSA on pressure injury)





Physical Exam





- Vital Signs


Temperature: 97.0 F


Pulse Rate: 61


O2 Saturation: 99


Blood Pressure: 162/60





- Physical Exam


General Appearance: positive: No acute distress


Eyes Bilateral: positive: No lid inflammation, Conjunctivae nml, No scleral 

icterus


ENT: positive: Pharynx nml, No signs of dehydration


Neck: positive: Thyroid nml, No JVD, Trachea midline


Cardiovascular: positive: Regular rate & rhythm, No murmur


Respiratory: positive: No respiratory distress, Diminished throughout, Wheezes, 

Rhonchi


Abdomen: positive: Soft


Skin: positive: Pressure wound (R side: hip, heel, malleolus, forearm)


Extremities: negative: Nml appearance (atrophic  lower extremities, 

particularly R side), Pedal edema


Neurologic/Psychiatric: positive: Disoriented to time, Weakness, Depressed mood/

affect





Palliative Care





- POLST


Patient has POLST: Yes


POLST Status: DNR, Selective Treatment


Pain: Pain unchanged, Location (R lower extremity)


Tiredness/Fatigue: Moderate (4-6), Comment (Increased sleep)


Depression: Moderate (4-6) (Depression with joceline)


Anxiety: Moderate (4-6)


Dyspnea: Comment (Unable to assess by patient's resonse. O2 sats 99%, but has 

dropped to 88% per Nursing)


Sleep: Other (Recently increased sleeping)


Constipation: Yes (with loose stools), Opoid induced, Managed


Performance Status: 





Sits up less, sleeps more, no longer interested in sitting in recliner, shows 

lack of interest in what previously interested him.








- Palliative Care


Discussion: 





We had arranged for a meeting for next week, 2/7/18, with patient's son and 

wife to discuss goals of care and wishes for further intervention in light of 

his current ongoing decline. Unfortunately this conversation had to be brought 

forward to today after nursing staff called with concerns and reports about 

patient's declining status. I updated the patient's son. After our conversation

, he wants to speak with his father this evening or tomorrow about his wishes 

for further intervention, hospitalization, or not, and he will follow up with 

me tomorrow. He reports that his mother has said she will agree with whatever 

her  wants. In the meantime, he is agreeable to have a chest xray to 

rule out respiratory infection, and agrees to increasing nebulizer treatment to 

4x/daily, and to start comfort morphine for breathlessness. 





We agreed he would speak with his father and call me.








Impression and Recommendations





- Palliative Care


Impression: 





This is an 86-year-old gentleman recently hospitalized for presumed sepsis from 

infected pressure injuries, or possibly pneumonia. He was treated with 

antibiotics, azithromycin and augmentin. He has been receiving nebulizer 

treatments three times daily for wheezing, cough, and difficulty with 

secretions. He is not hypoxic, today's O2 sats are 99%. He is withdrawn, less 

active, and sleeps more than before.





Recommendations/Counseling Done: 





Wheezing, rhonchi:  Increase Duonebs nebulizer treatment to 4x/daily. Ordered 

chest xray to rule out pneumonia. Start morphine sulfate solution 100mg/5mL, 

0.25mL by mouth every 3 hours as needed for breathlessness.





Pressure injuries:  wound care is managing. MRSA precautions will continue 

until R foot culture is negative one week after finishing antibiotics, then a 

second negative culture one week after the first culture.





Constipation: No BMs on occasion for 3 days, but with recent reports of loose 

stools. Continue Miralax and senna daily and monitoring bowel movements.





Advanced care planning:  A meeting regarding goals of care was planned with the 

family for next week, but his poor status precipitated bringing up the subject 

today with the son.  Patient was not in a position or frame of mind to answer 

questions. Son will speak with his father regarding his wishes for further 

interventions, or hospitalizations. In the meantime, he agreed to run a chest 

xray, and to start liquid morphine as comfort care for breathlessness.





Son to call me after he speaks with the patient.





Time Spent: 





45 minutes were spent with more than 50% of the time spent on counseling, 

education, and coordination of care. Provided anticipatory guidance.

## 2018-02-07 ENCOUNTER — HOSPITAL ENCOUNTER (OUTPATIENT)
Dept: HOSPITAL 76 - PC | Age: 83
Discharge: HOME | End: 2018-02-07
Attending: NURSE PRACTITIONER
Payer: MEDICARE

## 2018-02-07 DIAGNOSIS — R11.0: ICD-10-CM

## 2018-02-07 DIAGNOSIS — Z66: ICD-10-CM

## 2018-02-07 DIAGNOSIS — I69.351: ICD-10-CM

## 2018-02-07 DIAGNOSIS — R10.9: ICD-10-CM

## 2018-02-07 DIAGNOSIS — T36.95XD: ICD-10-CM

## 2018-02-07 DIAGNOSIS — A49.02: ICD-10-CM

## 2018-02-07 DIAGNOSIS — K52.1: ICD-10-CM

## 2018-02-07 DIAGNOSIS — L89.519: ICD-10-CM

## 2018-02-07 DIAGNOSIS — I69.320: ICD-10-CM

## 2018-02-07 DIAGNOSIS — R45.1: ICD-10-CM

## 2018-02-07 DIAGNOSIS — Z51.5: Primary | ICD-10-CM

## 2018-02-07 DIAGNOSIS — R06.2: ICD-10-CM

## 2018-02-07 DIAGNOSIS — Z79.891: ICD-10-CM

## 2018-02-07 DIAGNOSIS — Z79.82: ICD-10-CM

## 2018-02-07 DIAGNOSIS — F41.9: ICD-10-CM

## 2018-02-07 DIAGNOSIS — L89.619: ICD-10-CM

## 2018-02-07 NOTE — CONSULTATION NOTE
Palliative Care Follow Up





- Referral


Referring Provider: Dr Breezy Kramer


Time of Visit: 1/7/2018  11:00-12:00


Referral setting: Assisted living (Seen in home setting due to taxing and 

considerable effort required to leave the home due to right side hemiplegi s/p 

distant CVA and being bed-bound. I also require access to patient's records in 

the facility.)





- Information Sources


Records reviewed: RN notes reviewed, Previous records reviewed


History/Review of Systems obtained from: Patient, Family, Nursing (This is an 86

-year-old man with R side hemiparesis and severe aphasia following a CVA in 

1991. 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 

heel & ankle are healing; ankle is still MRSA positive)


Exam limitations: Clinical condition (aphasia, has extreme difficulty 

communicating, easily agitated/angered)





- History of Present Illness Update


Brief HPI Update: 





This is an 86 year old an with R side hemiparesis and severe aphasia following 

a CVA in 1991. He has chronic pressure wounds on R hip, R ankle, R heel, and R 

forearm.  Hip and forearm are healed; ankle and heel are healing; the ankle 

still tests positive for MRSA so the patient continues as contact precautions.





He was hospitalized 1/13/2018 for elevated white count and presumed sepsis, and 

was treated in the context of MRSA, with discharge back to the facility on 

azithromycin and amoxicillin and HH wound care was resumed.





Post-hospitalizaion he suffered from wheezing and excessive secretions, and was 

started on Duonebs nebulizer 3x/daily, then increased to 4x/daily. His 

breathing and fatigue have improved but wheezing  persists, audible via 

auscultation.





Today's visit was a family conference to discuss goals of care. In attendance 

were the patient's wife and son, the patient, and myself. The patient had 

previously indicated to a home health wound care nurse that he was adamant he 

did not want to do wound care, that he was not afraid to die. To the nurse it 

seemed clear that he was "done," emotionally speaking.  I spoke with the son 

about this, and he visited his father, and, due to the extreme difficulty the 

patient has in expressing himself due to aphasia, wrote down specific yes/no 

questions to determine what his father's wishes were. At that time, Feb 2, the 

patient did indicate he would agree to antibiotic treatment for treating his 

MRSA wound, but this would be the last time.  As to whether he would want to go 

back to the hospital if he was sick or got pneumonia the patient stated no.  

And if the patient had the choice to end his life, "would you choose to die?" 

the patient responded yes to that, and that he would also want to be kept 

comfortable until he passed.





In light of the increasing complications with the patient's situation we 

decided to schedule a family conference for this week to clarify goals of care.

  Last week I had ordered a chest x-ray to rule out pneumonia and the x-ray did 

come back negative.  The 1/29/18 culture of his heel wound was positive for MRSA

, so he continues on contact precautions. His primary care provider ordered 

Bactrim DS for MRSA ankle wound and the patient did agree to go ahead with the 

antibiotic, which was started 2/2/18, and  is currently about FDC through 

the 14 day regimen.





The patient's main concern and complaint today is the diarrhea, an adverse 

effect from the antibiotic. He has refused some dosings due to this. Nursing 

confirms the loose stools do not have the C diff distinctive odor, consistency, 

and coloring. Patient indicated he is also having nausea, although this is 

uncertain due to the very extreme difficulty of understanding him with his 

aphasia.  During the course of this meeting he was repeatedly asked about goals 

of care, specifically whether he wanted any continued treatment in case of 

future infections. Both his son and I found it very difficult to understand 

what he tries to day, but he communicated that if GI side effects could be 

controlled, he is agreeable to receiving future antibiotic treatments, as 

needed.  With that being the case, he does not at this time meet psychosocial 

criteria for hospice.





Nursing had reported last week that he was eating less, staying in bed and not 

sitting up in his recliner or going to the dining room, was listless and just 

did not seem himself.  However this week he is eating better, and is agreeing 

to sit up in his recliner, something he was refusing last week. His breathing 

is improved, and he continues the nebulizer treatment 4 times a day, which he 

confirms is helping his shortness of breath.  He does continue to have wheezing 

upon auscultation, but lungs are sound better.  Again the chest x-ray taken 

last week was negative for pneumonia.





The family was in agreement with the plan to continue the current antibiotic, 

and to start him on medications to manage loose stools and also nausea. 


 





Social History





- Living Situation


Living arrangement: Assisted living ([facility] dementia unit)


Living Situation: With caregiver(s)


Support System: 





Spouse lives across the street in assisted living, and his son lives locally 

and is involved in his care.








Medications/Allergies





- Medications


Home Medications: 


 Ambulatory Orders











 Medication  Instructions  Recorded  Confirmed


 


Lisinopril [Zestril] 20 mg PO DAILY 08/14/16 02/08/18


 


Polyethylene Glycol 3350 [Miralax] 17 gm PO DAILY MDD hold for loose 11/01/17 02 /08/18





 stools  


 


QUEtiapine [SEROquel] 25 mg PO BID 11/01/17 02/08/18


 


Senna [Senokot] 8.6 mg PO DAILY MDD hold for loose 12/07/17 02/08/18





 stools  


 


Aspirin [Aspirin EC] 81 mg PO DAILY 01/13/18 02/08/18


 


Atorvastatin Calcium 40 mg PO DAILY 01/13/18 02/08/18


 


Multivitamin [Theragran] 1 tab PO DAILY 01/13/18 02/08/18


 


HYDROcod/ACETAM 5/325 [Norco 5/325] 1 tab PO TID #30 tablet 01/16/18 02/08/18


 


Acetaminophen [Tylenol] 500 mg PO Q4HR PRN 01/20/18 02/08/18


 


HYDROcod/ACETAM 5/325 [Norco 5/325] 1 tab PO Q4HR PRN MDD nte 3000 mg 01/20/18 02/08/18





 apap  


 


Ipratropium/Albuterol [Duoneb] 1 vial INH QID 01/20/18 02/08/18


 


guaiFENesin [Guaifenesin] 20 ml PO Q4HR PRN 01/20/18 02/08/18


 


Morphine Sulfate [Morphine Sulf 0.25 ml PO Q3H PRN 01/31/18 02/08/18





Oral (Roxanol)]   


 


Saccharomyces Boulardii [Florastor] 250 mg PO BID MDD for 28 days 02/08/18 02/08 /18


 


Sulfamethox/Trimeth 800/160 1 tab PO BID MDD for 14 days 02/08/18 02/08/18





[Bactrim Ds 800/160]   














- Allergies


Allergies/Adverse Reactions: 


 Allergies











Allergy/AdvReac Type Severity Reaction Status Date / Time


 


No Known Drug Allergies Allergy   Verified 01/13/18 14:42














Review of Systems





- Constitutional


Constitutional: reports: Fatigue, Weight loss (204.2 lbs 2/5/18.  217 lbs 1/7/ 18.  217.8 lbs  11/3/17.  214.8 lbs  11/1/17)





- Ears, Nose & Throat


Ears, Nose & Throat: reports: Hearing loss





- Respiratory


Respiratory: reports: Wheezing, SOB at rest





- Gastrointestinal


Gastrointestinal: reports: Abdominal pain, Diarrhea, Nausea





- Genitourinary


Genitourinary: reports: Incontinence





- Psychiatric


Psychiatric: reports: Anxiety, Aggitation





Physical Exam





- Vital Signs


Temperature: 98.0 F


Pulse Rate: 68


O2 Saturation: 95


Blood Pressure: 104/70





- Physical Exam


General Appearance: positive: Moderate distress, Anxious


Eyes Bilateral: positive: EOMI, No lid inflammation, Conjunctivae nml, No 

scleral icterus


ENT: positive: No signs of dehydration


Neck: positive: Thyroid nml, No JVD, Trachea midline


Cardiovascular: positive: Regular rate & rhythm, No murmur, No gallop


Respiratory: positive: No respiratory distress, Diminished throughout, Wheezes, 

Rhonchi


Abdomen: positive: Non-tender, Soft, Obese


Skin: positive: Pressure wound (R hip, forearm and heel  pressure wounds 

healed. Ankle healing, positive for MRSA at 1/29 culture.)


Extremities: positive: No pedal edema, Other (muscle wasting R lower extremity, 

hemiparesis)


Neurologic/Psychiatric: positive: Sensation nml, Disoriented to time, 

Unintelligible speech (aphasia)





Palliative Care





- POLST


Patient has POLST: Yes


POLST Status: DNR, Selective Treatment


Pain: Comment (routine norco for pain)


Nausea: Mild (1-3)


Anxiety: Moderate (4-6)


Dyspnea: Moderate (4-6)


Anorexia: Moderate (4-6)


Constipation: Comment (loose stools. hold Miralax and senna for loose stools. 

On bowel meds because he is on routine opioid)





- Palliative Care


Discussion: 





Today's visit was a family conference with the wife and son of the patient.  He 

had previously indicated that he wanted no further antibiotic treatment after 

the current regimen was completed for the MRSA wound in his heel.  The purpose 

of today's meeting was to clarify goals of care.  During the course of this 

visit the patient did indicate he was willing to have antibiotic treatments in 

the future, if indicated, as long as GI side effects could be controlled.  He 

indicates he is suffering quite intensely from the diarrhea, loose stools, and 

possibly nausea, though this is difficult to verify due to his aphasia.





The patient and the son agreed to plan to complete the current antibiotic 

regimen andmedication and start medications to control the GI symptoms.  The 

patient's wife was also an agreement all, although she was not following 

closely the conversation.








Impression and Recommendations





- Palliative Care


Impression: 





This is an 86-year-old man with right-sided hemiparesis and severe aphasia S/P 

CVA in 1991.  He was hospitalized last month for presumed sepsis, he is 

currently being treated with Bactrim DS for MRSA positive heel wound, and is 

suffering from GI side effects.  Goals of care were clarified, with the patient 

willing to undergo future antibiotic treatment as long as GI side effects can 

be controlled with medication.  Antidiarrheal and anti-nausea medications were 

ordered today.





Recommendations/Counseling Done: 





Wheezing, rhonchi: Continue duo nebs nebulizer treatment 4 times a day, recent 

chest x-ray was negative for pneumonia, morphine sulfate is available for 

shortness of breath.





MRSA wound on the right ankle:  Continue the Bactrim DS regimen, which is due 

to complete 2/15/18.  Also Florastor for a total of 28 days.





Loose stools: Start loperamide as needed, 4 mg after first episode, 2 mg after 

each episode thereafter.  Maximum total 16 mg per day.  Hold MiraLAX and senna 

for loose stools.  These bowel medications are for opioid constipation.





Nausea: Start Phenergan 25 mg suppository, give rectally every 8 hours as 

needed.





Advance care planning: Patient does not meet criteria for hospice due to wish 

for future antibiotic treatment if needed for infection control.  His main 

concern is controlling GI symptoms in the case of needing antibiotics.  Pulse 

is signed, DNR and selective treatment.





Time Spent: 





60 minutes were spent with more than 50% of the time spent on counseling, 

education, coordination of care and advanced care planning.

## 2018-03-01 ENCOUNTER — HOSPITAL ENCOUNTER (OUTPATIENT)
Dept: HOSPITAL 76 - PC | Age: 83
Discharge: HOME | End: 2018-03-01
Attending: NURSE PRACTITIONER
Payer: MEDICARE

## 2018-03-01 DIAGNOSIS — R06.2: ICD-10-CM

## 2018-03-01 DIAGNOSIS — Z79.891: ICD-10-CM

## 2018-03-01 DIAGNOSIS — R45.4: ICD-10-CM

## 2018-03-01 DIAGNOSIS — I69.320: ICD-10-CM

## 2018-03-01 DIAGNOSIS — Z51.5: Primary | ICD-10-CM

## 2018-03-01 DIAGNOSIS — I69.351: ICD-10-CM

## 2018-03-01 DIAGNOSIS — L89.519: ICD-10-CM

## 2018-03-01 DIAGNOSIS — Z79.82: ICD-10-CM

## 2018-03-01 DIAGNOSIS — R06.02: ICD-10-CM

## 2018-03-01 DIAGNOSIS — M79.671: ICD-10-CM

## 2018-03-01 DIAGNOSIS — I10: ICD-10-CM

## 2018-03-01 DIAGNOSIS — Z74.01: ICD-10-CM

## 2018-03-01 DIAGNOSIS — Z79.51: ICD-10-CM

## 2018-03-01 DIAGNOSIS — Z66: ICD-10-CM

## 2018-03-01 DIAGNOSIS — L89.619: ICD-10-CM

## 2018-03-01 NOTE — CONSULTATION NOTE
Palliative Care Follow Up





- Referral


Referring Provider: Dr Breezy Kramer


Time of Visit: 3/1/2018.  11:55 - 12:35


Referral setting: Assisted living (Seen in home setting due to taxing and 

considerable effort required to leave the home due to right side hemiplegia s/p 

distant CVA and being bed-bound. I also require access to the patient's records 

in the facility.)





- Information Sources


Records reviewed: RN notes reviewed, Previous records reviewed


History/Review of Systems obtained from: Patient, Family, Nursing


Exam limitations: Clinical condition (aphasia, extreme difficulty commmunicating

, easily agitated/angered)





- History of Present Illness Update


Brief HPI Update: 





This is an 86-year-old man with right-sided hemiparesis and severe aphasia 

following a CVA in 1991.  He is being treated by home health nursing for 

chronic pressure wounds on his right side.  The right forearm and hip wounds 

have healed.  The right ankle and heel wounds are in the process of healing and 

still require home health nursing.  The patient is often uncooperative and 

nursing recently was unable to retest for MRSA infection, so the patient 

remains on infection precautions at the facility.





The patient had been having wheezing in the past, treated by nebulizer 

treatments, and it had been improving.  Nursing reports today the wheezing and 

lung sounds have increased.  He had complained of SOB a few hours after his 

morning nebulizer treatment and prior to the next scheduled treatment. I OK'd 

an extra treatment for today.





Upon assessment indeed his wheezing is worse, there are rhonchi, but he denies 

coughing.  Today the patient is very agitated and upset, he earlier had been 

noncompliant with the wound care RN.  He was upset and angry during the entire 

visit today, but he did cooperate with vital signs.  I discussed with him my 

recommendations for a short course of oral steroids to relieve the wheezing, 

and also discussed starting an antibiotic since he is at high risk of 

aspiration pneumonia.  He was vociferously, to the best of his limited ability 

to communicate, adamant that he did not want any additional medications.  I did 

clarify with him and asked for confirmation that he: 1.  Did not want any 

steroid treatment for his wheezing and 2. Did not want any antibiotics for his 

respiratory system.  He confirmed he did not want either of these 

interventions.  All he wants is his nebulizer treatment -- he is that it 

relieves his shortness of breath.





Note:  CXR done on 2/1/18 did not reveal either pneumonia or CHF, but "mild 

cardiomegaly,"





He was very insistent about his dissatisfaction with the dressings on his right 

heel and eventually was able to make clear he is unhappy with the size of the 

dressing which covers his entire foot from above the ankle to the ball of the 

foot. He notes that he only has 2 very small wounds on the heel and the ankle.  

This is an ongoing complaint of his according to nursing, and he frequently 

picks at and tries to remove the dressing, which he was doing during my 

assessment. Nursing also reports that he has stated current pain medications do 

not "begin to touch" the pain in the foot wounds.











Social History





- Living Situation


Living arrangement: Assisted living ([facility] unit)


Living Situation: With caregiver(s)


Support System: 





Son lives close by and his wife lives in assisted living across the street.








Medications/Allergies





- Medications


Home Medications: 


 Ambulatory Orders











 Medication  Instructions  Recorded  Confirmed


 


Lisinopril [Zestril] 20 mg PO DAILY 08/14/16 02/08/18


 


Polyethylene Glycol 3350 [Miralax] 17 gm PO DAILY MDD hold for loose 11/01/17 02 /08/18





 stools  


 


QUEtiapine [SEROquel] 25 mg PO BID 11/01/17 02/08/18


 


Senna [Senokot] 8.6 mg PO DAILY MDD hold for loose 12/07/17 02/08/18





 stools  


 


Aspirin [Aspirin EC] 81 mg PO DAILY 01/13/18 02/08/18


 


Atorvastatin Calcium 40 mg PO DAILY 01/13/18 02/08/18


 


Multivitamin [Theragran] 1 tab PO DAILY 01/13/18 02/08/18


 


Acetaminophen [Tylenol] 500 mg PO Q4HR PRN 01/20/18 02/08/18


 


HYDROcod/ACETAM 5/325 [Norco 5/325] 1 tab PO Q4HR PRN MDD nte 3000 mg 01/20/18 02/08/18





 apap  


 


Ipratropium/Albuterol [Duoneb] 1 vial INH QID 01/20/18 02/08/18


 


guaiFENesin [Guaifenesin] 20 ml PO Q4HR PRN 01/20/18 02/08/18


 


Morphine Sulfate [Morphine Sulf 0.25 ml PO Q3H PRN 01/31/18 02/08/18





Oral (Roxanol)]   


 


Saccharomyces Boulardii [Florastor] 250 mg PO BID MDD for 28 days 02/08/18 02/08 /18


 


Hydrocodone/Acetaminophen 1 tab PO TID 03/01/18 03/01/18





[Hydrocodone-Acetamin 7.5-325]   














- Allergies


Allergies/Adverse Reactions: 


 Allergies











Allergy/AdvReac Type Severity Reaction Status Date / Time


 


No Known Drug Allergies Allergy   Verified 01/13/18 14:42














Review of Systems





- Constitutional


Constitutional: denies: Fever, Chills





- Ears, Nose & Throat


Ears, Nose & Throat: reports: Hearing loss





- Cardiovascular


Cardiovascular: denies: Chest pain, Edema





- Respiratory


Respiratory: reports: Cough, Wheezing, SOB at rest, SOB with exertion





- Musculoskeletal


Musculoskeletal: reports: Stiffness





- Integumentary


Integumentary: reports: Other (Pressure wounds healed on R forearm and R hip.  

Pressure wounds on R heel and ankle are healing.)





- Neurological


Neurological: reports: Slurred speech (significant aphasia, very difficult to 

understand)





- Psychiatric


Psychiatric: reports: Anxiety, Aggitation, Other (Chronic anger)





Physical Exam





- Vital Signs


Temperature: 97.3 F


Pulse Rate: 73


O2 Saturation: 99


Blood Pressure: 120/65





- Physical Exam


General Appearance: positive: Moderate distress (anger, agitation about being 

bothered by my visit)


Eyes Bilateral: positive: EOMI, No lid inflammation, Conjunctivae nml, No 

scleral icterus


ENT: positive: No signs of dehydration


Neck: positive: No JVD, Trachea midline


Cardiovascular: positive: Regular rate & rhythm, No murmur, No gallop


Respiratory: positive: Chest non-tender, No respiratory distress, Wheezes, 

Rhonchi


Abdomen: positive: Non-tender, Soft


Skin: positive: Pressure wound (Healed on R forearm and hip. Healing on R heel 

and ankle. Per  RN report. R foot is covered in a dressing, which the patient 

is trying to reove)


Extremities: positive: No pedal edema, Other (R side hemiparesis)


Neurologic/Psychiatric: positive: Oriented x3, Other (Angry, agitated affect.)





Palliative Care





- POLST


Patient has POLST: Yes


POLST Status: DNR, Selective Treatment


Pain: Location (R heel and ankle), Comment (pain not controlled on current 

regimen)


Dyspnea: Mild (1-3) (after the nebulizer treatment), Moderate (4-6) (earlier 

today, after the a.m. nebulizer treatment, but prior to the noon treatment.)


Feelings of wellbeing/Perceived Quality of Life: Poor





- Palliative Care


Discussion: 





I spoke with the son today about his father's medical decisions. The patient 

confirmed to me that he did not want to have a short course of oral steroids to 

help with wheezing, nor to start antibiotics, despite his risk of aspiration 

pneumonia. We have had lengthy discussions over the past month  about his father

's desire to "be done with it," and his prior refusals to have antibiotics. The 

previous time we started him on antibiotics was Bactrim for his ankle wound, 

and he eventually reluctantly agreed, but said it was "the last time" he'd use 

antibiotics.  And during that cycle he often refused the antibiotics.





Today he was quite adamant with me that he did not want antibiotics or oral 

steroids. He only wants the nebulizer treatment. I explained to the son that 

the patient is at increased risk of respiratory infection and if we do not 

treat it may increase his risk of mortality from infection. I did explain his 

father has agency and the right to refuse treatment. The son understood. He 

plans to visit his father in the next day or so and will update us on what he 

says  At this time, I am not starting any new medications, I will increase the 

amount of daily nebulizer treatments he can have, and adjust his pain 

medications.











Impression and Recommendations





- Palliative Care


Impression: 





This is an 86-year-old man with right-sided hemiparesis and severe aphasia s/p 

CVA in 1991. He has been declining and is at risk of increased infections, both 

respiratory and in his R foot pressure wounds, although they are healing well. 

The ankle wound has been MRSA positive; we have not been able to retest due to 

patient refusal. The patient is often angry and has existential pain and 

suffering, indicating in the past that "he is done" and putting his hand to his 

head in mimicry of a gun.  He has communicated in the past he wants no further 

antibiotic treatments, and today reconfirmed that to me. The son is aware of 

his increased risk for respiratory and MRSA infections and their sequelae. The 

wife may be less aware, she has her own cognitive deficits. She has statedd in 

the past that the patient's medical decisions are his to make.  Palliative care 

will continue to monitor the patient, concentrate on keeping him as comfortable 

as possible and honoring his goals of care.





Recommendations/Counseling Done: 





Pressure wounds on R side:  Improvement.  R hip and R forearm wounds have 

healed. R ankle and R heel wounds are in process of healing.  Nursing unable to 

obtain a recent culture to test for MRSA, patient refused and he remains on 

contact precautions.  Increased pain medications:  Stopped routine [place] 5/325 

4xday.  Started routine Norco 7.5/325 4xday.  Continue Norco 5/325 Q4h PRN.  

Maximum dosage: 3000mg APAP per day.





Wheezing/SOB:  Worsened today. Patient refused a short course of oral steroids 

and a course of antibiotics for respiratory disease.  He wants to continue with 

just the nebulizer treatments. Continue DuoNeb nebulizer treatment routine 4x/

day and add DuoNeb nebulizer treatment BID prn.  Maximum: 6 treatments per day.

  CXR taken on 2/1/18 revealed no pneumonia or CHF, just mild cardiomegaly.





Advanced care planning:  POLST is DNR and selective treatment.  Patient has 

stated he does not want treatment with oral steroids or antibiotics at this 

time. Discussed this with patient's son and that his father has agency over his 

own medical decisions, and that he is at risk for sequelae of infection and 

increased risk of mortality if he does have a respiratory infection, which the 

son understands.  He will speak with his father in the next few days.  

Palliative care will continue to provide oversight and monitor for appropriate 

transition to Hospice.





Follow up next week.





Time Spent: 





40 minutes were spent with more than 50% of the time spent on counseling, 

education, and coordination of care.

## 2018-03-05 ENCOUNTER — HOSPITAL ENCOUNTER (OUTPATIENT)
Dept: HOSPITAL 76 - LAB.N | Age: 83
Discharge: HOME | End: 2018-03-05
Attending: FAMILY MEDICINE
Payer: MEDICARE

## 2018-03-05 DIAGNOSIS — L89.619: Primary | ICD-10-CM

## 2018-03-05 PROCEDURE — 87070 CULTURE OTHR SPECIMN AEROBIC: CPT

## 2018-03-05 PROCEDURE — 87205 SMEAR GRAM STAIN: CPT

## 2018-04-09 ENCOUNTER — HOSPITAL ENCOUNTER (OUTPATIENT)
Dept: HOSPITAL 76 - EMS | Age: 83
End: 2018-04-09
Attending: SURGERY
Payer: MEDICARE